# Patient Record
Sex: MALE | Race: BLACK OR AFRICAN AMERICAN | NOT HISPANIC OR LATINO | Employment: FULL TIME | URBAN - METROPOLITAN AREA
[De-identification: names, ages, dates, MRNs, and addresses within clinical notes are randomized per-mention and may not be internally consistent; named-entity substitution may affect disease eponyms.]

---

## 2021-09-14 ENCOUNTER — OFFICE VISIT (OUTPATIENT)
Dept: INTERNAL MEDICINE | Facility: CLINIC | Age: 51
End: 2021-09-14
Payer: COMMERCIAL

## 2021-09-14 VITALS
HEIGHT: 73 IN | BODY MASS INDEX: 20.16 KG/M2 | TEMPERATURE: 98 F | DIASTOLIC BLOOD PRESSURE: 82 MMHG | SYSTOLIC BLOOD PRESSURE: 136 MMHG | WEIGHT: 152.13 LBS

## 2021-09-14 DIAGNOSIS — C85.80 LARGE CELL LYMPHOMA: ICD-10-CM

## 2021-09-14 DIAGNOSIS — Z28.39 IMMUNIZATION DEFICIENCY: ICD-10-CM

## 2021-09-14 DIAGNOSIS — Z00.00 ANNUAL PHYSICAL EXAM: Primary | ICD-10-CM

## 2021-09-14 DIAGNOSIS — D17.79 LIPOMA OF OTHER SPECIFIED SITES: ICD-10-CM

## 2021-09-14 DIAGNOSIS — Z12.5 SCREENING FOR PROSTATE CANCER: ICD-10-CM

## 2021-09-14 DIAGNOSIS — Z12.11 COLON CANCER SCREENING: ICD-10-CM

## 2021-09-14 DIAGNOSIS — K42.9 UMBILICAL HERNIA WITHOUT OBSTRUCTION AND WITHOUT GANGRENE: ICD-10-CM

## 2021-09-14 PROBLEM — D17.9 LIPOMA: Status: ACTIVE | Noted: 2021-09-14

## 2021-09-14 PROCEDURE — 99999 PR PBB SHADOW E&M-EST. PATIENT-LVL III: CPT | Mod: PBBFAC,,, | Performed by: FAMILY MEDICINE

## 2021-09-14 PROCEDURE — 99999 PR PBB SHADOW E&M-EST. PATIENT-LVL III: ICD-10-PCS | Mod: PBBFAC,,, | Performed by: FAMILY MEDICINE

## 2021-09-14 PROCEDURE — 99204 OFFICE O/P NEW MOD 45 MIN: CPT | Mod: S$GLB,,, | Performed by: FAMILY MEDICINE

## 2021-09-14 PROCEDURE — 99204 PR OFFICE/OUTPT VISIT, NEW, LEVL IV, 45-59 MIN: ICD-10-PCS | Mod: S$GLB,,, | Performed by: FAMILY MEDICINE

## 2021-09-15 ENCOUNTER — TELEPHONE (OUTPATIENT)
Dept: INTERNAL MEDICINE | Facility: CLINIC | Age: 51
End: 2021-09-15

## 2021-12-20 PROBLEM — Z00.00 ANNUAL PHYSICAL EXAM: Status: RESOLVED | Noted: 2021-09-14 | Resolved: 2021-12-20

## 2023-02-13 ENCOUNTER — OFFICE VISIT (OUTPATIENT)
Dept: INTERNAL MEDICINE | Facility: CLINIC | Age: 53
End: 2023-02-13
Payer: COMMERCIAL

## 2023-02-13 ENCOUNTER — LAB VISIT (OUTPATIENT)
Dept: LAB | Facility: HOSPITAL | Age: 53
End: 2023-02-13
Attending: FAMILY MEDICINE
Payer: COMMERCIAL

## 2023-02-13 VITALS
BODY MASS INDEX: 20.28 KG/M2 | HEIGHT: 73 IN | RESPIRATION RATE: 18 BRPM | TEMPERATURE: 99 F | OXYGEN SATURATION: 98 % | SYSTOLIC BLOOD PRESSURE: 136 MMHG | WEIGHT: 153 LBS | DIASTOLIC BLOOD PRESSURE: 82 MMHG | HEART RATE: 93 BPM

## 2023-02-13 DIAGNOSIS — Z00.00 ANNUAL PHYSICAL EXAM: Primary | ICD-10-CM

## 2023-02-13 DIAGNOSIS — Z12.5 SCREENING FOR PROSTATE CANCER: ICD-10-CM

## 2023-02-13 DIAGNOSIS — Z12.11 COLON CANCER SCREENING: ICD-10-CM

## 2023-02-13 DIAGNOSIS — Z28.39 IMMUNIZATION DEFICIENCY: ICD-10-CM

## 2023-02-13 DIAGNOSIS — F31.9 BIPOLAR DISEASE, CHRONIC: ICD-10-CM

## 2023-02-13 DIAGNOSIS — D17.79 LIPOMA OF OTHER SPECIFIED SITES: ICD-10-CM

## 2023-02-13 DIAGNOSIS — Z30.09 VASECTOMY EVALUATION: ICD-10-CM

## 2023-02-13 LAB — COMPLEXED PSA SERPL-MCNC: 0.7 NG/ML (ref 0–4)

## 2023-02-13 PROCEDURE — 99999 PR PBB SHADOW E&M-EST. PATIENT-LVL IV: CPT | Mod: PBBFAC,,, | Performed by: FAMILY MEDICINE

## 2023-02-13 PROCEDURE — 99214 OFFICE O/P EST MOD 30 MIN: CPT | Mod: S$GLB,,, | Performed by: FAMILY MEDICINE

## 2023-02-13 PROCEDURE — 99214 PR OFFICE/OUTPT VISIT, EST, LEVL IV, 30-39 MIN: ICD-10-PCS | Mod: S$GLB,,, | Performed by: FAMILY MEDICINE

## 2023-02-13 PROCEDURE — 84153 ASSAY OF PSA TOTAL: CPT | Performed by: FAMILY MEDICINE

## 2023-02-13 PROCEDURE — 36415 COLL VENOUS BLD VENIPUNCTURE: CPT | Performed by: FAMILY MEDICINE

## 2023-02-13 PROCEDURE — 99999 PR PBB SHADOW E&M-EST. PATIENT-LVL IV: ICD-10-PCS | Mod: PBBFAC,,, | Performed by: FAMILY MEDICINE

## 2023-02-13 NOTE — PROGRESS NOTES
Subjective:       Patient ID: Darin Fulton is a 52 y.o. male.    Chief Complaint: Follow-up and Referral    Annual checkup.  He is interested in cancer screening.  Has a history of large-cell lymphoma no recurrence.  Currently smokes 1/2 pack cigarettes cigarettes a day.  He denies chest pain palpitations shortness of breath.  He denies nausea vomiting hematemesis melena.  Denies any constipation.  He denies any symptoms.  He is also interested urology referral for vasectomy.  Previous lab was reviewed.  He is interested in a PSA but not interested in other lab test at this time.  Discussed need for low-dose CT.  Wants to consider this and let me know when he is ready.    Follow-up  Pertinent negatives include no abdominal pain, chest pain, chills, congestion, coughing, fever, headaches, nausea, vomiting or weakness.   Review of Systems   Constitutional:  Negative for appetite change, chills, fever and unexpected weight change.   HENT:  Negative for congestion.    Respiratory:  Negative for cough, chest tightness, shortness of breath and wheezing.    Cardiovascular:  Negative for chest pain, palpitations and leg swelling.   Gastrointestinal:  Negative for abdominal distention, abdominal pain, blood in stool, constipation, diarrhea, nausea and vomiting.   Genitourinary:  Negative for difficulty urinating, dysuria, frequency, hematuria and urgency.   Neurological:  Negative for dizziness, weakness, light-headedness and headaches.        He has concern about possible bipolar disorder with episodes depression and hyperactivity   Hematological:  Negative for adenopathy. Does not bruise/bleed easily.     Objective:      Physical Exam  Constitutional:       General: He is not in acute distress.     Appearance: He is not ill-appearing or diaphoretic.   HENT:      Nose: Nose normal.   Eyes:      Conjunctiva/sclera: Conjunctivae normal.   Neck:      Comments: Normal thyroid 2+ carotids  Cardiovascular:      Rate and  Rhythm: Normal rate and regular rhythm.      Heart sounds: No murmur heard.    No gallop.   Pulmonary:      Effort: Pulmonary effort is normal. No respiratory distress.      Breath sounds: No wheezing, rhonchi or rales.   Abdominal:      General: There is no distension.      Palpations: Abdomen is soft. There is no mass.      Tenderness: There is no abdominal tenderness.   Lymphadenopathy:      Cervical: No cervical adenopathy.   Skin:     General: Skin is warm and dry.      Coloration: Skin is not pale.      Findings: No erythema.      Comments: Stable lipoma right hip   Neurological:      Mental Status: He is alert and oriented to person, place, and time.   Psychiatric:         Mood and Affect: Mood normal.         Behavior: Behavior normal.         Thought Content: Thought content normal.         Judgment: Judgment normal.       Lab Visit on 09/14/2021   Component Date Value Ref Range Status    WBC 09/14/2021 4.37  3.90 - 12.70 K/uL Final    RBC 09/14/2021 4.15 (L)  4.60 - 6.20 M/uL Final    Hemoglobin 09/14/2021 12.0 (L)  14.0 - 18.0 g/dL Final    Hematocrit 09/14/2021 38.2 (L)  40.0 - 54.0 % Final    MCV 09/14/2021 92  82 - 98 fL Final    MCH 09/14/2021 28.9  27.0 - 31.0 pg Final    MCHC 09/14/2021 31.4 (L)  32.0 - 36.0 g/dL Final    RDW 09/14/2021 18.0 (H)  11.5 - 14.5 % Final    Platelets 09/14/2021 255  150 - 450 K/uL Final    MPV 09/14/2021 12.2  9.2 - 12.9 fL Final    Immature Granulocytes 09/14/2021 0.2  0.0 - 0.5 % Final    Gran # (ANC) 09/14/2021 2.2  1.8 - 7.7 K/uL Final    Immature Grans (Abs) 09/14/2021 0.01  0.00 - 0.04 K/uL Final    Lymph # 09/14/2021 1.5  1.0 - 4.8 K/uL Final    Mono # 09/14/2021 0.6  0.3 - 1.0 K/uL Final    Eos # 09/14/2021 0.1  0.0 - 0.5 K/uL Final    Baso # 09/14/2021 0.05  0.00 - 0.20 K/uL Final    nRBC 09/14/2021 0  0 /100 WBC Final    Gran % 09/14/2021 51.0  38.0 - 73.0 % Final    Lymph % 09/14/2021 33.6  18.0 - 48.0 % Final    Mono % 09/14/2021 13.0  4.0 - 15.0 % Final     Eosinophil % 09/14/2021 1.1  0.0 - 8.0 % Final    Basophil % 09/14/2021 1.1  0.0 - 1.9 % Final    Differential Method 09/14/2021 Automated   Final    Sodium 09/14/2021 140  136 - 145 mmol/L Final    Potassium 09/14/2021 4.7  3.5 - 5.1 mmol/L Final    Chloride 09/14/2021 104  95 - 110 mmol/L Final    CO2 09/14/2021 23  23 - 29 mmol/L Final    Glucose 09/14/2021 77  70 - 110 mg/dL Final    BUN 09/14/2021 9  6 - 20 mg/dL Final    Creatinine 09/14/2021 0.8  0.5 - 1.4 mg/dL Final    Calcium 09/14/2021 10.1  8.7 - 10.5 mg/dL Final    Total Protein 09/14/2021 6.8  6.0 - 8.4 g/dL Final    Albumin 09/14/2021 3.9  3.5 - 5.2 g/dL Final    Total Bilirubin 09/14/2021 0.5  0.1 - 1.0 mg/dL Final    Alkaline Phosphatase 09/14/2021 94  55 - 135 U/L Final    AST 09/14/2021 22  10 - 40 U/L Final    ALT 09/14/2021 18  10 - 44 U/L Final    Anion Gap 09/14/2021 13  8 - 16 mmol/L Final    eGFR if African American 09/14/2021 >60.0  >60 mL/min/1.73 m^2 Final    eGFR if non African American 09/14/2021 >60.0  >60 mL/min/1.73 m^2 Final    Cholesterol 09/14/2021 155  120 - 199 mg/dL Final    Triglycerides 09/14/2021 49  30 - 150 mg/dL Final    HDL 09/14/2021 71  40 - 75 mg/dL Final    LDL Cholesterol 09/14/2021 74.2  63.0 - 159.0 mg/dL Final    HDL/Cholesterol Ratio 09/14/2021 45.8  20.0 - 50.0 % Final    Total Cholesterol/HDL Ratio 09/14/2021 2.2  2.0 - 5.0 Final    Non-HDL Cholesterol 09/14/2021 84  mg/dL Final    TSH 09/14/2021 0.594  0.400 - 4.000 uIU/mL Final    PSA, Screen 09/14/2021 1.1  0.00 - 4.00 ng/mL Final    Hepatitis C Ab 09/14/2021 Negative  Negative Final    HIV 1/2 Ag/Ab 09/14/2021 Negative  Negative Final     Assessment:       1. Annual physical exam    2. Colon cancer screening    3. Bipolar disease, chronic    4. Vasectomy evaluation    5. Screening for prostate cancer    6. Lipoma of other specified sites    7. Immunization deficiency          Plan:   Colonoscopy ordered urology referral for vasectomy evaluation psychology  referral for bipolar disorder evaluation PSA was ordered he declined immunizations he wants to defer CT low-dose lung.  Discuss cigarette cessation

## 2023-02-14 ENCOUNTER — PATIENT MESSAGE (OUTPATIENT)
Dept: PSYCHIATRY | Facility: CLINIC | Age: 53
End: 2023-02-14
Payer: COMMERCIAL

## 2023-02-16 ENCOUNTER — TELEPHONE (OUTPATIENT)
Dept: UROLOGY | Facility: CLINIC | Age: 53
End: 2023-02-16
Payer: COMMERCIAL

## 2023-02-20 ENCOUNTER — HOSPITAL ENCOUNTER (OUTPATIENT)
Dept: PREADMISSION TESTING | Facility: HOSPITAL | Age: 53
Discharge: HOME | End: 2023-02-20
Attending: INTERNAL MEDICINE
Payer: COMMERCIAL

## 2023-02-20 DIAGNOSIS — Z12.11 COLON CANCER SCREENING: Primary | ICD-10-CM

## 2023-02-20 RX ORDER — POLYETHYLENE GLYCOL 3350, SODIUM SULFATE ANHYDROUS, SODIUM BICARBONATE, SODIUM CHLORIDE, POTASSIUM CHLORIDE 236; 22.74; 6.74; 5.86; 2.97 G/4L; G/4L; G/4L; G/4L; G/4L
4 POWDER, FOR SOLUTION ORAL ONCE
Qty: 4000 ML | Refills: 0 | Status: SHIPPED | OUTPATIENT
Start: 2023-02-20 | End: 2023-02-20

## 2023-03-08 DIAGNOSIS — Z12.11 COLON CANCER SCREENING: Primary | ICD-10-CM

## 2023-03-27 ENCOUNTER — HOSPITAL ENCOUNTER (OUTPATIENT)
Dept: PREADMISSION TESTING | Facility: HOSPITAL | Age: 53
Discharge: HOME | End: 2023-03-27
Attending: COLON & RECTAL SURGERY
Payer: COMMERCIAL

## 2023-03-27 DIAGNOSIS — Z12.11 COLON CANCER SCREENING: Primary | ICD-10-CM

## 2023-03-27 RX ORDER — POLYETHYLENE GLYCOL 3350, SODIUM SULFATE ANHYDROUS, SODIUM BICARBONATE, SODIUM CHLORIDE, POTASSIUM CHLORIDE 236; 22.74; 6.74; 5.86; 2.97 G/4L; G/4L; G/4L; G/4L; G/4L
4 POWDER, FOR SOLUTION ORAL ONCE
Qty: 4000 ML | Refills: 0 | Status: SHIPPED | OUTPATIENT
Start: 2023-03-27 | End: 2023-03-27

## 2023-05-15 PROBLEM — Z00.00 ANNUAL PHYSICAL EXAM: Status: RESOLVED | Noted: 2021-09-14 | Resolved: 2023-05-15

## 2023-06-15 ENCOUNTER — TELEPHONE (OUTPATIENT)
Dept: INTERNAL MEDICINE | Facility: CLINIC | Age: 53
End: 2023-06-15
Payer: COMMERCIAL

## 2023-06-15 ENCOUNTER — PATIENT MESSAGE (OUTPATIENT)
Dept: INTERNAL MEDICINE | Facility: CLINIC | Age: 53
End: 2023-06-15
Payer: COMMERCIAL

## 2023-06-15 NOTE — TELEPHONE ENCOUNTER
Returned call to patient regarding appointment request. Spoke with significant other who stated patient was not home. Left message for patient to return call to schedule an appointment.

## 2023-06-19 ENCOUNTER — OFFICE VISIT (OUTPATIENT)
Dept: INTERNAL MEDICINE | Facility: CLINIC | Age: 53
End: 2023-06-19
Payer: COMMERCIAL

## 2023-06-19 VITALS
WEIGHT: 150.81 LBS | SYSTOLIC BLOOD PRESSURE: 140 MMHG | RESPIRATION RATE: 18 BRPM | HEART RATE: 79 BPM | DIASTOLIC BLOOD PRESSURE: 88 MMHG | BODY MASS INDEX: 19.99 KG/M2 | HEIGHT: 73 IN | OXYGEN SATURATION: 97 %

## 2023-06-19 DIAGNOSIS — Z72.0 SMOKING TRYING TO QUIT: Primary | ICD-10-CM

## 2023-06-19 DIAGNOSIS — F17.200 NEEDS SMOKING CESSATION EDUCATION: ICD-10-CM

## 2023-06-19 DIAGNOSIS — F48.9 MENTAL HEALTH PROBLEM: ICD-10-CM

## 2023-06-19 PROCEDURE — 99999 PR PBB SHADOW E&M-EST. PATIENT-LVL IV: ICD-10-PCS | Mod: PBBFAC,,, | Performed by: NURSE PRACTITIONER

## 2023-06-19 PROCEDURE — 99214 PR OFFICE/OUTPT VISIT, EST, LEVL IV, 30-39 MIN: ICD-10-PCS | Mod: S$GLB,,, | Performed by: NURSE PRACTITIONER

## 2023-06-19 PROCEDURE — 99214 OFFICE O/P EST MOD 30 MIN: CPT | Mod: S$GLB,,, | Performed by: NURSE PRACTITIONER

## 2023-06-19 PROCEDURE — 99999 PR PBB SHADOW E&M-EST. PATIENT-LVL IV: CPT | Mod: PBBFAC,,, | Performed by: NURSE PRACTITIONER

## 2023-06-19 RX ORDER — NICOTINE 7MG/24HR
1 PATCH, TRANSDERMAL 24 HOURS TRANSDERMAL DAILY
Qty: 14 PATCH | Refills: 0 | Status: SHIPPED | OUTPATIENT
Start: 2023-06-19

## 2023-06-19 RX ORDER — BUPROPION HYDROCHLORIDE 150 MG/1
150 TABLET ORAL DAILY
Qty: 30 TABLET | Refills: 11 | Status: SHIPPED | OUTPATIENT
Start: 2023-06-19 | End: 2023-09-15 | Stop reason: SDUPTHER

## 2023-06-19 NOTE — PROGRESS NOTES
Darin Fulton  06/19/2023  [id number]    Ruslan Flower MD  Patient Care Team:  Ruslan Flower MD as PCP - General (Family Medicine)          Visit Type:an urgent visit for a new problem    Chief Complaint:  Chief Complaint   Patient presents with    Anxiety    Depression       History of Present Illness:  53-year-old male presents today requesting a referral to Psychiatry.  Patient feels that he might be suffering from bipolar disorder and needs evaluation and treatment.  Patient also is requesting smoking cessation.      History:  Past Medical History:   Diagnosis Date    Hodgkin's disease     Ulcer      Past Surgical History:   Procedure Laterality Date    HERNIA REPAIR      MANDIBLE FRACTURE SURGERY      stomach surgery because of ulcer      WRIST SURGERY       Family History   Problem Relation Age of Onset    No Known Problems Mother     Hypertension Father     No Known Problems Daughter     No Known Problems Son      Social History     Socioeconomic History    Marital status: Single   Tobacco Use    Smoking status: Every Day     Packs/day: 0.50     Types: Cigarettes    Smokeless tobacco: Never   Substance and Sexual Activity    Alcohol use: Yes     Comment: socially    Drug use: Yes     Types: Marijuana     Patient Active Problem List   Diagnosis    Large cell lymphoma    Umbilical hernia    Lipoma    Immunization deficiency    Bipolar disease, chronic     Review of patient's allergies indicates:  No Known Allergies    The following were reviewed at this visit: active problem list, medication list, allergies, family history, social history, and health maintenance.    Medications:  No current outpatient medications on file prior to visit.     No current facility-administered medications on file prior to visit.       Medications have been reviewed and reconciled with patient at this visit.  Barriers to medications reviewed with patient.    Adverse reactions to current medications reviewed with  patient..    Over the counter medications reviewed and reconciled with patient.    Exam:  Wt Readings from Last 3 Encounters:   06/19/23 68.4 kg (150 lb 12.7 oz)   02/13/23 69.4 kg (153 lb)   09/14/21 69 kg (152 lb 1.9 oz)     Temp Readings from Last 3 Encounters:   02/13/23 98.5 °F (36.9 °C) (Tympanic)   09/14/21 98.3 °F (36.8 °C) (Tympanic)   09/27/14 98.2 °F (36.8 °C) (Oral)     BP Readings from Last 3 Encounters:   06/19/23 (!) 140/88   02/13/23 136/82   09/14/21 136/82     Pulse Readings from Last 3 Encounters:   06/19/23 79   02/13/23 93   09/27/14 68     Body mass index is 19.89 kg/m².      Review of Systems   Constitutional:  Negative for fever.   HENT:  Negative for hearing loss.    Eyes:  Negative for discharge.   Respiratory:  Negative for cough, shortness of breath and wheezing.    Cardiovascular:  Negative for chest pain and palpitations.   Gastrointestinal:  Negative for blood in stool, constipation, diarrhea, nausea and vomiting.   Genitourinary:  Negative for hematuria and urgency.   Musculoskeletal:  Negative for neck pain.   Neurological:  Negative for speech change, weakness and headaches.   Endo/Heme/Allergies:  Negative for polydipsia.   All other systems reviewed and are negative.  Physical Exam  Vitals and nursing note reviewed.   Constitutional:       Appearance: Normal appearance. He is normal weight.   HENT:      Head: Normocephalic and atraumatic.      Right Ear: Tympanic membrane, ear canal and external ear normal.      Left Ear: Tympanic membrane, ear canal and external ear normal.      Nose: Nose normal.      Mouth/Throat:      Mouth: Mucous membranes are moist.      Pharynx: Oropharynx is clear.   Eyes:      Extraocular Movements: Extraocular movements intact.      Conjunctiva/sclera: Conjunctivae normal.      Pupils: Pupils are equal, round, and reactive to light.   Cardiovascular:      Rate and Rhythm: Normal rate and regular rhythm.      Pulses: Normal pulses.      Heart sounds:  Normal heart sounds.   Pulmonary:      Effort: Pulmonary effort is normal.      Breath sounds: Normal breath sounds.   Abdominal:      General: Bowel sounds are normal.      Palpations: Abdomen is soft.   Musculoskeletal:         General: Normal range of motion.      Cervical back: Normal range of motion and neck supple.   Skin:     General: Skin is warm and dry.      Capillary Refill: Capillary refill takes less than 2 seconds.   Neurological:      General: No focal deficit present.      Mental Status: He is alert and oriented to person, place, and time.   Psychiatric:         Mood and Affect: Mood normal.         Behavior: Behavior normal.         Thought Content: Thought content normal.         Judgment: Judgment normal.       Laboratory Reviewed ({Yes)  Lab Results   Component Value Date    WBC 4.37 09/14/2021    HGB 12.0 (L) 09/14/2021    HCT 38.2 (L) 09/14/2021     09/14/2021    CHOL 155 09/14/2021    TRIG 49 09/14/2021    HDL 71 09/14/2021    ALT 18 09/14/2021    AST 22 09/14/2021     09/14/2021    K 4.7 09/14/2021     09/14/2021    CREATININE 0.8 09/14/2021    BUN 9 09/14/2021    CO2 23 09/14/2021    TSH 0.594 09/14/2021    PSA 0.70 02/13/2023       Darin was seen today for anxiety and depression.    Diagnoses and all orders for this visit:    Smoking trying to quit  -     buPROPion (WELLBUTRIN XL) 150 MG TB24 tablet; Take 1 tablet (150 mg total) by mouth once daily.  -     nicotine (NICODERM CQ) 7 mg/24 hr; Place 1 patch onto the skin once daily.    Mental health problem  -     Ambulatory referral/consult to Psychiatry; Future  -     Ambulatory referral/consult to Psychiatry; Future        - F/u with psych  -F/U smoking cessation program        Care Plan/Goals: Reviewed    Goals    None     Darin was seen today for anxiety and depression.    Diagnoses and all orders for this visit:    Smoking trying to quit  -     buPROPion (WELLBUTRIN XL) 150 MG TB24 tablet; Take 1 tablet (150 mg  total) by mouth once daily.  -     nicotine (NICODERM CQ) 7 mg/24 hr; Place 1 patch onto the skin once daily.    Mental health problem  -     Ambulatory referral/consult to Psychiatry; Future  -     Ambulatory referral/consult to Psychiatry; Future         Follow up: Follow up for with psych for evaluation and treatment .    After visit summary was printed and given to patient upon discharge today.  Patient goals and care plan are included in After Visit Summary.

## 2023-09-15 DIAGNOSIS — Z72.0 SMOKING TRYING TO QUIT: ICD-10-CM

## 2023-09-15 RX ORDER — BUPROPION HYDROCHLORIDE 150 MG/1
150 TABLET ORAL DAILY
Qty: 30 TABLET | Refills: 11 | Status: SHIPPED | OUTPATIENT
Start: 2023-09-15 | End: 2024-09-14

## 2024-02-14 ENCOUNTER — LAB VISIT (OUTPATIENT)
Dept: LAB | Facility: HOSPITAL | Age: 54
End: 2024-02-14
Payer: COMMERCIAL

## 2024-02-14 ENCOUNTER — OFFICE VISIT (OUTPATIENT)
Dept: INTERNAL MEDICINE | Facility: CLINIC | Age: 54
End: 2024-02-14
Payer: COMMERCIAL

## 2024-02-14 VITALS
SYSTOLIC BLOOD PRESSURE: 130 MMHG | OXYGEN SATURATION: 97 % | BODY MASS INDEX: 20.74 KG/M2 | TEMPERATURE: 98 F | RESPIRATION RATE: 18 BRPM | DIASTOLIC BLOOD PRESSURE: 74 MMHG | WEIGHT: 156.5 LBS | HEIGHT: 73 IN | HEART RATE: 96 BPM

## 2024-02-14 DIAGNOSIS — D17.1 LIPOMA OF TORSO: ICD-10-CM

## 2024-02-14 DIAGNOSIS — Z00.00 ANNUAL PHYSICAL EXAM: Primary | ICD-10-CM

## 2024-02-14 DIAGNOSIS — Z12.11 COLON CANCER SCREENING: ICD-10-CM

## 2024-02-14 DIAGNOSIS — Z30.09 VASECTOMY EVALUATION: ICD-10-CM

## 2024-02-14 DIAGNOSIS — Z12.5 SCREENING FOR PROSTATE CANCER: ICD-10-CM

## 2024-02-14 DIAGNOSIS — Z00.00 ANNUAL PHYSICAL EXAM: ICD-10-CM

## 2024-02-14 LAB
BILIRUB UR QL STRIP: NEGATIVE
CLARITY UR: CLEAR
COLOR UR: YELLOW
GLUCOSE UR QL STRIP: NEGATIVE
HGB UR QL STRIP: NEGATIVE
KETONES UR QL STRIP: NEGATIVE
LEUKOCYTE ESTERASE UR QL STRIP: NEGATIVE
NITRITE UR QL STRIP: NEGATIVE
PH UR STRIP: 6 [PH] (ref 5–8)
PROT UR QL STRIP: NEGATIVE
SP GR UR STRIP: 1.02 (ref 1–1.03)
URN SPEC COLLECT METH UR: NORMAL
UROBILINOGEN UR STRIP-ACNC: NEGATIVE EU/DL

## 2024-02-14 PROCEDURE — 81003 URINALYSIS AUTO W/O SCOPE: CPT

## 2024-02-14 PROCEDURE — 99386 PREV VISIT NEW AGE 40-64: CPT | Mod: S$GLB,,,

## 2024-02-14 PROCEDURE — 99999 PR PBB SHADOW E&M-EST. PATIENT-LVL V: CPT | Mod: PBBFAC,,,

## 2024-02-14 NOTE — PROGRESS NOTES
Darin Fulton  02/14/2024  [id number]    Ruslan Flower MD  Patient Care Team:  Ruslan Flower MD as PCP - General (Family Medicine)          Visit Type:a scheduled routine follow-up visit    Chief Complaint:  Chief Complaint   Patient presents with    Back Pain    Urinary Frequency    Sterilization    Mass       History of Present Illness:    Presents today for an annual exam     Interested in getting a vasectomy     Has lipoma on right hip  Has been there for years  Would like it examined     History:  Past Medical History:   Diagnosis Date    Hodgkin's disease     Ulcer      Past Surgical History:   Procedure Laterality Date    HERNIA REPAIR      MANDIBLE FRACTURE SURGERY      stomach surgery because of ulcer      WRIST SURGERY       Family History   Problem Relation Age of Onset    No Known Problems Mother     Hypertension Father     No Known Problems Daughter     No Known Problems Son      Social History     Socioeconomic History    Marital status: Single   Tobacco Use    Smoking status: Every Day     Current packs/day: 0.50     Types: Cigarettes    Smokeless tobacco: Never   Substance and Sexual Activity    Alcohol use: Yes     Comment: socially    Drug use: Yes     Types: Marijuana     Social Determinants of Health     Financial Resource Strain: Low Risk  (2/7/2024)    Overall Financial Resource Strain (CARDIA)     Difficulty of Paying Living Expenses: Not very hard   Food Insecurity: Food Insecurity Present (2/7/2024)    Hunger Vital Sign     Worried About Running Out of Food in the Last Year: Sometimes true     Ran Out of Food in the Last Year: Never true   Transportation Needs: No Transportation Needs (2/7/2024)    PRAPARE - Transportation     Lack of Transportation (Medical): No     Lack of Transportation (Non-Medical): No   Physical Activity: Sufficiently Active (2/7/2024)    Exercise Vital Sign     Days of Exercise per Week: 5 days     Minutes of Exercise per Session: 30 min   Stress: Stress  Concern Present (2/7/2024)    [employer] - Occupational Stress Questionnaire     Feeling of Stress : Rather much   Social Connections: Unknown (2/7/2024)    Social Connection and Isolation Panel [NHANES]     Frequency of Communication with Friends and Family: Once a week     Frequency of Social Gatherings with Friends and Family: Once a week     Active Member of Clubs or Organizations: No     Attends Club or Organization Meetings: Never     Marital Status:    Housing Stability: High Risk (2/7/2024)    Housing Stability Vital Sign     Unable to Pay for Housing in the Last Year: Yes     Number of Places Lived in the Last Year: 1     Unstable Housing in the Last Year: No     Patient Active Problem List   Diagnosis    Large cell lymphoma    Umbilical hernia    Lipoma    Immunization deficiency    Bipolar disease, chronic     Review of patient's allergies indicates:  No Known Allergies    The following were reviewed at this visit: active problem list, medication list, allergies, family history, social history, and health maintenance.    Medications:  Current Outpatient Medications on File Prior to Visit   Medication Sig Dispense Refill    buPROPion (WELLBUTRIN XL) 150 MG TB24 tablet Take 1 tablet (150 mg total) by mouth once daily. 30 tablet 11    nicotine (NICODERM CQ) 7 mg/24 hr Place 1 patch onto the skin once daily. (Patient not taking: Reported on 2/14/2024) 14 patch 0     No current facility-administered medications on file prior to visit.       Medications have been reviewed and reconciled with patient at this visit.  Barriers to medications reviewed with patient.    Adverse reactions to current medications reviewed with patient..    Over the counter medications reviewed and reconciled with patient.    Exam:  Wt Readings from Last 3 Encounters:   02/14/24 71 kg (156 lb 8.4 oz)   06/19/23 68.4 kg (150 lb 12.7 oz)   02/13/23 69.4 kg (153 lb)     Temp Readings from Last 3  Encounters:   02/14/24 97.8 °F (36.6 °C) (Tympanic)   02/13/23 98.5 °F (36.9 °C) (Tympanic)   09/14/21 98.3 °F (36.8 °C) (Tympanic)     BP Readings from Last 3 Encounters:   02/14/24 130/74   06/19/23 (!) 140/88   02/13/23 136/82     Pulse Readings from Last 3 Encounters:   02/14/24 96   06/19/23 79   02/13/23 93     Body mass index is 20.65 kg/m².      Review of Systems   HENT:  Negative for hearing loss.    Eyes:  Negative for discharge.   Respiratory:  Negative for wheezing.    Cardiovascular:  Negative for chest pain and palpitations.   Gastrointestinal:  Negative for blood in stool, constipation, diarrhea and vomiting.   Genitourinary:  Negative for hematuria and urgency.   Musculoskeletal:  Negative for neck pain.   Neurological:  Negative for weakness.   Endo/Heme/Allergies:  Negative for polydipsia.     Answers submitted by the patient for this visit:  Review of Systems Questionnaire (Submitted on 2/7/2024)  activity change: No  unexpected weight change: No  rhinorrhea: Yes  trouble swallowing: No  visual disturbance: Yes  chest tightness: No  polyuria: No  difficulty urinating: No  joint swelling: No  arthralgias: No  confusion: Yes  dysphoric mood: Yes      Physical Exam  Vitals and nursing note reviewed.   Constitutional:       General: He is not in acute distress.     Appearance: He is well-developed. He is not diaphoretic.   HENT:      Head: Normocephalic and atraumatic.      Right Ear: External ear normal.      Left Ear: External ear normal.   Cardiovascular:      Rate and Rhythm: Normal rate and regular rhythm.      Heart sounds: Normal heart sounds. No murmur heard.  Pulmonary:      Effort: Pulmonary effort is normal. No respiratory distress.      Breath sounds: Normal breath sounds. No wheezing.   Abdominal:      General: Bowel sounds are normal.   Skin:            Comments: Lipoma right hip    Neurological:      Mental Status: He is alert and oriented to person, place, and time.   Psychiatric:          Behavior: Behavior normal.         Thought Content: Thought content normal.         Judgment: Judgment normal.         Laboratory Reviewed ({Yes)  Lab Results   Component Value Date    WBC 4.37 09/14/2021    HGB 12.0 (L) 09/14/2021    HCT 38.2 (L) 09/14/2021     09/14/2021    CHOL 155 09/14/2021    TRIG 49 09/14/2021    HDL 71 09/14/2021    ALT 18 09/14/2021    AST 22 09/14/2021     09/14/2021    K 4.7 09/14/2021     09/14/2021    CREATININE 0.8 09/14/2021    BUN 9 09/14/2021    CO2 23 09/14/2021    TSH 0.594 09/14/2021    PSA 0.70 02/13/2023       Darin was seen today for back pain, urinary frequency, sterilization and mass.    Diagnoses and all orders for this visit:    Annual physical exam  -     CBC Auto Differential; Future  -     TSH; Future  -     Lipid Panel; Future  -     Comprehensive Metabolic Panel; Future  -     HEMOGLOBIN A1C; Future  -     Urinalysis, Reflex to Urine Culture; Future    Vasectomy evaluation  -     Ambulatory referral/consult to Urology; Future    Colon cancer screening  -     Ambulatory referral/consult to Endo Procedure ; Future    Lipoma of torso  Would like to cont to monitor for now  May be interested in getting it removed later     Screening for prostate cancer  -     PSA, SCREENING; Future      Labs today   Refer to urology     Will schedule a follow up appointment with Dr. Flower in one year     Care Plan/Goals: Reviewed    Goals    None         Follow up: No follow-ups on file.    After visit summary was printed and given to patient upon discharge today.  Patient goals and care plan are included in After Visit Summary.

## 2024-02-19 ENCOUNTER — HOSPITAL ENCOUNTER (OUTPATIENT)
Dept: PREADMISSION TESTING | Facility: HOSPITAL | Age: 54
Discharge: HOME | End: 2024-02-19
Attending: INTERNAL MEDICINE
Payer: COMMERCIAL

## 2024-02-19 DIAGNOSIS — Z12.11 COLON CANCER SCREENING: Primary | ICD-10-CM

## 2024-02-20 ENCOUNTER — OFFICE VISIT (OUTPATIENT)
Dept: UROLOGY | Facility: CLINIC | Age: 54
End: 2024-02-20
Payer: COMMERCIAL

## 2024-02-20 VITALS
RESPIRATION RATE: 14 BRPM | HEIGHT: 73 IN | BODY MASS INDEX: 20.98 KG/M2 | SYSTOLIC BLOOD PRESSURE: 165 MMHG | HEART RATE: 82 BPM | DIASTOLIC BLOOD PRESSURE: 94 MMHG | WEIGHT: 158.31 LBS

## 2024-02-20 DIAGNOSIS — Z30.2 STERILIZATION: ICD-10-CM

## 2024-02-20 PROCEDURE — 99203 OFFICE O/P NEW LOW 30 MIN: CPT | Mod: S$GLB,,, | Performed by: UROLOGY

## 2024-02-20 PROCEDURE — 99999 PR PBB SHADOW E&M-EST. PATIENT-LVL III: CPT | Mod: PBBFAC,,, | Performed by: UROLOGY

## 2024-02-20 NOTE — PROGRESS NOTES
HPI:    Mr. Fulton is a 53 y.o. male who presents for evaluation re: vasectomy. He has 5 children, youngest 1-month-old and he is certain that he desires no more. He is aware of other forms of contraception. He is aware that vasectomy is to be considered permanent/irreversible.    PATIENT HISTORY:    Past Medical History:   Diagnosis Date    Hodgkin's disease     Ulcer        Past Surgical History:   Procedure Laterality Date    HERNIA REPAIR      MANDIBLE FRACTURE SURGERY      stomach surgery because of ulcer      WRIST SURGERY         Family History   Problem Relation Age of Onset    No Known Problems Mother     Hypertension Father     No Known Problems Daughter     No Known Problems Son        Social History     Socioeconomic History    Marital status: Single   Tobacco Use    Smoking status: Every Day     Current packs/day: 0.50     Types: Cigarettes    Smokeless tobacco: Never   Substance and Sexual Activity    Alcohol use: Yes     Comment: socially    Drug use: Yes     Types: Marijuana     Social Determinants of Health     Financial Resource Strain: Low Risk  (2/7/2024)    Overall Financial Resource Strain (CARDIA)     Difficulty of Paying Living Expenses: Not very hard   Food Insecurity: Food Insecurity Present (2/7/2024)    Hunger Vital Sign     Worried About Running Out of Food in the Last Year: Sometimes true     Ran Out of Food in the Last Year: Never true   Transportation Needs: No Transportation Needs (2/7/2024)    PRAPARE - Transportation     Lack of Transportation (Medical): No     Lack of Transportation (Non-Medical): No   Physical Activity: Sufficiently Active (2/7/2024)    Exercise Vital Sign     Days of Exercise per Week: 5 days     Minutes of Exercise per Session: 30 min   Stress: Stress Concern Present (2/7/2024)    [employer] - Occupational Stress Questionnaire     Feeling of Stress : Rather much   Social Connections: Unknown (2/7/2024)    Social Connection and  Isolation Panel [NHANES]     Frequency of Communication with Friends and Family: Once a week     Frequency of Social Gatherings with Friends and Family: Once a week     Active Member of Clubs or Organizations: No     Attends Club or Organization Meetings: Never     Marital Status:    Housing Stability: High Risk (2/7/2024)    Housing Stability Vital Sign     Unable to Pay for Housing in the Last Year: Yes     Number of Places Lived in the Last Year: 1     Unstable Housing in the Last Year: No         Current Outpatient Medications:     buPROPion (WELLBUTRIN XL) 150 MG TB24 tablet, Take 1 tablet (150 mg total) by mouth once daily., Disp: 30 tablet, Rfl: 11    nicotine (NICODERM CQ) 7 mg/24 hr, Place 1 patch onto the skin once daily., Disp: 14 patch, Rfl: 0     Allergies:  Patient has no known allergies.    PHYSICAL EXAM:    General appearance: Well-developed, well-nourished male in no apparent distress.    Genitourinary: Bilateral testicles palpaby normal. Epididymis, vas deferens, and cord structures are normal bilaterally.    IMPRESSION / PLAN:    Darin Fulton is a 53 y.o. male who presents for evaluation re: vasectomy.    I discussed with the patient risks and benefits, as well as alternatives. We discussed possible complications at length, including fever, infection, bleeding (possibly requiring re-operation), testicular injury or atrophy with loss of function, chronic pain, persistent or recurrent presence of sperm in the ejaculate, or vasal recanalization.    We discussed that he should stop aspirin, ibuprofen, and similar products at least one week prior to the procedure. Acetaminophen is okay to use for headaches, pain, etc.    He should bring an athletic supporter and loose-fitting sweat pants on the day of the procedure.    We discussed that he must continue to use contraception until two consecutive semen analyses (checked at approximately 8 and 10 weeks post-vasectomy) reveal  azoospermia.    He will schedule an elective vasectomy.      He was decided he does not want to take Valium prior to the procedure.

## 2024-02-27 ENCOUNTER — PROCEDURE VISIT (OUTPATIENT)
Dept: UROLOGY | Facility: CLINIC | Age: 54
End: 2024-02-27
Payer: COMMERCIAL

## 2024-02-27 VITALS
RESPIRATION RATE: 18 BRPM | SYSTOLIC BLOOD PRESSURE: 154 MMHG | BODY MASS INDEX: 21.77 KG/M2 | DIASTOLIC BLOOD PRESSURE: 95 MMHG | HEART RATE: 78 BPM | WEIGHT: 165 LBS

## 2024-02-27 DIAGNOSIS — Z30.2 STERILIZATION: Primary | ICD-10-CM

## 2024-02-27 PROCEDURE — 88302 TISSUE EXAM BY PATHOLOGIST: CPT | Mod: 59 | Performed by: PATHOLOGY

## 2024-02-27 PROCEDURE — 55250 REMOVAL OF SPERM DUCT(S): CPT | Mod: S$GLB,,, | Performed by: UROLOGY

## 2024-02-27 PROCEDURE — 88302 TISSUE EXAM BY PATHOLOGIST: CPT | Mod: 26,,, | Performed by: PATHOLOGY

## 2024-02-27 NOTE — PROCEDURES
Vasectomy    Date/Time: 2/27/2024 4:00 PM    Performed by: Lalo Steel MD  Authorized by: Lalo Steel MD    Anesthesia:  10 cc 2% Lidocaine  Incisions:  1  Vas:  Fulgurated and Clipped     Vasectomy Procedure Note  PreopDx. Sterilization  Post op Dx. Same  Procedure: bilateral vasectomy  EBL <5 cc  Complications : none  Specimen: 1) right vas deferens 2) left vas deferens  Anesthesia: local lidocaine 1% 10 cc  Procedure in detail:   A time-out was performed.  After being prepped and draped sterilely, patient's left vas deferens was palpated and then grasped and brought up to the skin.  The skin and subcutaneous tissues along with the vas deferens were infiltrated with 1% lidocaine.  This point a 15 blade was used to make a subcentimeter incision in the scrotal median raphe.  The vas ring was used to grasp the vas deferens.  The vas dissected was used to carefully dissect layers away from the vas deferens until it was isolated.  A 2 cm portion was isolated.  Proximal and distal titanium clips were placed.  The intervening segment was excised and sent for permanent pathology.  The ends were then cauterized with the electrocautery.  Inspection for meticulous hemostasis was made.  Minimal cautery was used for hemostasis.  I then moved to the opposite side of the table and performed a similar procedure with the right vas deferens.  Once this was completed the specimen was also obtained and sent for pathology.  The midline incision was closed with a 4-0 Chromic suture.  Bacitracin ointment and 4x4s were applied.    The patient was once again counseled on postoperative instructions and reminded to bring into postoperative semen specimens to ensure azoospermia prior to resuming sexual activity without contraception.    He told me took a BC powder a day or 2 ago.  He did have some oozing during the procedure.  I have asked him to take it easy for a couple of days.

## 2024-03-04 LAB
FINAL PATHOLOGIC DIAGNOSIS: NORMAL
GROSS: NORMAL
Lab: NORMAL

## 2024-05-03 DIAGNOSIS — Z72.0 SMOKING TRYING TO QUIT: ICD-10-CM

## 2024-05-03 RX ORDER — BUPROPION HYDROCHLORIDE 150 MG/1
150 TABLET ORAL DAILY
Qty: 30 TABLET | Refills: 11 | Status: SHIPPED | OUTPATIENT
Start: 2024-05-03 | End: 2024-06-04 | Stop reason: DRUGHIGH

## 2024-05-03 RX ORDER — NICOTINE 7MG/24HR
1 PATCH, TRANSDERMAL 24 HOURS TRANSDERMAL DAILY
Qty: 14 PATCH | Refills: 0 | Status: SHIPPED | OUTPATIENT
Start: 2024-05-03 | End: 2024-06-04 | Stop reason: SDUPTHER

## 2024-05-14 ENCOUNTER — TELEPHONE (OUTPATIENT)
Dept: PSYCHIATRY | Facility: CLINIC | Age: 54
End: 2024-05-14
Payer: COMMERCIAL

## 2024-05-14 NOTE — TELEPHONE ENCOUNTER
----- Message from Blanquita Montoya MA sent at 5/14/2024 12:11 PM CDT -----  Regarding: Testing Referral  Contact: Lucinda/ wife  Good afternoon,  This patient has a referral in the chart from 6/2023 to be scheduled for testing. Patient hasn't spoken with anyone and would like to be contacted for scheduling. It is ok to call wife's number listed.  ----- Message -----  From: Agnes Fletcher  Sent: 5/14/2024  11:16 AM CDT  To: [facility] Psych Clinical Staff    Patients wife calling to receive a call back at [phone]. Reports pt has a referral for psych and would like to proceed with scheduling.

## 2024-06-04 ENCOUNTER — OFFICE VISIT (OUTPATIENT)
Dept: INTERNAL MEDICINE | Facility: CLINIC | Age: 54
End: 2024-06-04
Payer: COMMERCIAL

## 2024-06-04 VITALS
HEART RATE: 86 BPM | SYSTOLIC BLOOD PRESSURE: 122 MMHG | DIASTOLIC BLOOD PRESSURE: 76 MMHG | OXYGEN SATURATION: 97 % | HEIGHT: 73 IN | BODY MASS INDEX: 20.6 KG/M2 | WEIGHT: 155.44 LBS | TEMPERATURE: 98 F

## 2024-06-04 DIAGNOSIS — Z12.11 COLON CANCER SCREENING: ICD-10-CM

## 2024-06-04 DIAGNOSIS — F31.9 BIPOLAR DISEASE, CHRONIC: ICD-10-CM

## 2024-06-04 DIAGNOSIS — F32.A DEPRESSION, UNSPECIFIED DEPRESSION TYPE: ICD-10-CM

## 2024-06-04 DIAGNOSIS — Z72.0 SMOKING TRYING TO QUIT: ICD-10-CM

## 2024-06-04 DIAGNOSIS — F17.210 CIGARETTE NICOTINE DEPENDENCE WITHOUT COMPLICATION: ICD-10-CM

## 2024-06-04 DIAGNOSIS — R68.82 LOW LIBIDO: Primary | ICD-10-CM

## 2024-06-04 PROCEDURE — 99214 OFFICE O/P EST MOD 30 MIN: CPT | Mod: S$GLB,,,

## 2024-06-04 PROCEDURE — 99999 PR PBB SHADOW E&M-EST. PATIENT-LVL V: CPT | Mod: PBBFAC,,,

## 2024-06-04 RX ORDER — NICOTINE 7MG/24HR
1 PATCH, TRANSDERMAL 24 HOURS TRANSDERMAL DAILY
Qty: 30 PATCH | Refills: 3 | Status: SHIPPED | OUTPATIENT
Start: 2024-06-04

## 2024-06-04 RX ORDER — BUPROPION HYDROCHLORIDE 300 MG/1
300 TABLET ORAL DAILY
Qty: 30 TABLET | Refills: 1 | Status: SHIPPED | OUTPATIENT
Start: 2024-06-04 | End: 2025-06-04

## 2024-06-04 RX ORDER — TADALAFIL 10 MG/1
10 TABLET ORAL DAILY PRN
Qty: 30 TABLET | Refills: 1 | Status: SHIPPED | OUTPATIENT
Start: 2024-06-04 | End: 2025-06-04

## 2024-06-04 NOTE — LETTER
June 4, 2024      [facility]  [id number] [place] [id number]  Phone: [phone]  Fax: [phone]       Patient: Darin Fulton   YOB: 1970  Date of Visit: 06/04/2024    To Whom It May Concern:    Aida Fulton  was at [facility] on 06/04/2024. The patient is currently under medical treatment for anxiety and depression.  If you have any questions or concerns, or if I can be of further assistance, please do not hesitate to contact me.    Sincerely,         April Montoya NP

## 2024-06-04 NOTE — PROGRESS NOTES
Darin Fulton  06/04/2024  [id number]    Ruslan Flower MD  Patient Care Team:  Ruslan Flower MD as PCP - General (Family Medicine)          Visit Type:an urgent visit for an existing problem     Chief Complaint:  Chief Complaint   Patient presents with    Medication Problem     Wellbutrin not working.         History of Present Illness:    History of bipolar disorder   Previously prescribed Wellbutrin 150 mg   He was not taking the been taking it daily  When he stops taking it for periods, takes a while for his body to get adjusted to the medication again        History:  Past Medical History:   Diagnosis Date    Hodgkin's disease     Ulcer      Past Surgical History:   Procedure Laterality Date    HERNIA REPAIR      MANDIBLE FRACTURE SURGERY      stomach surgery because of ulcer      WRIST SURGERY       Family History   Problem Relation Name Age of Onset    No Known Problems Mother      Hypertension Father      No Known Problems Daughter      No Known Problems Son       Social History     Socioeconomic History    Marital status: Single   Tobacco Use    Smoking status: Every Day     Current packs/day: 0.50     Types: Cigarettes    Smokeless tobacco: Never   Substance and Sexual Activity    Alcohol use: Yes     Comment: socially    Drug use: Yes     Types: Marijuana     Social Determinants of Health     Financial Resource Strain: Low Risk  (2/7/2024)    Overall Financial Resource Strain (CARDIA)     Difficulty of Paying Living Expenses: Not very hard   Food Insecurity: Food Insecurity Present (2/7/2024)    Hunger Vital Sign     Worried About Running Out of Food in the Last Year: Sometimes true     Ran Out of Food in the Last Year: Never true   Transportation Needs: No Transportation Needs (2/7/2024)    PRAPARE - Transportation     Lack of Transportation (Medical): No     Lack of Transportation (Non-Medical): No   Physical Activity: Sufficiently Active (2/7/2024)    Exercise Vital Sign     Days of Exercise  per Week: 5 days     Minutes of Exercise per Session: 30 min   Stress: Stress Concern Present (2/7/2024)    [employer] - Occupational Stress Questionnaire     Feeling of Stress : Rather much   Housing Stability: High Risk (2/7/2024)    Housing Stability Vital Sign     Unable to Pay for Housing in the Last Year: Yes     Number of Places Lived in the Last Year: 1     Unstable Housing in the Last Year: No     Patient Active Problem List   Diagnosis    Large cell lymphoma    Umbilical hernia    Lipoma    Immunization deficiency    Bipolar disease, chronic     Review of patient's allergies indicates:  No Known Allergies    The following were reviewed at this visit: active problem list, medication list, allergies, family history, social history, and health maintenance.    Medications:  Current Outpatient Medications on File Prior to Visit   Medication Sig Dispense Refill    buPROPion (WELLBUTRIN XL) 150 MG TB24 tablet Take 1 tablet (150 mg total) by mouth once daily. 30 tablet 11    nicotine (NICODERM CQ) 7 mg/24 hr Place 1 patch onto the skin once daily. 14 patch 0     No current facility-administered medications on file prior to visit.       Medications have been reviewed and reconciled with patient at this visit.  Barriers to medications reviewed with patient.    Adverse reactions to current medications reviewed with patient..    Over the counter medications reviewed and reconciled with patient.    Exam:  Wt Readings from Last 3 Encounters:   02/27/24 74.8 kg (165 lb)   02/20/24 71.8 kg (158 lb 4.6 oz)   02/14/24 71 kg (156 lb 8.4 oz)     Temp Readings from Last 3 Encounters:   02/14/24 97.8 °F (36.6 °C) (Tympanic)   02/13/23 98.5 °F (36.9 °C) (Tympanic)   09/14/21 98.3 °F (36.8 °C) (Tympanic)     BP Readings from Last 3 Encounters:   02/27/24 (!) 154/95   02/20/24 (!) 165/94   02/14/24 130/74     Pulse Readings from Last 3 Encounters:   02/27/24 78   02/20/24 82   02/14/24 96     There is  no height or weight on file to calculate BMI.      Review of Systems   Cardiovascular:  Negative for chest pain and palpitations.   Psychiatric/Behavioral:  Positive for depression. Negative for suicidal ideas.      Physical Exam  Nursing note reviewed.   HENT:      Head: Normocephalic and atraumatic.   Cardiovascular:      Rate and Rhythm: Normal rate and regular rhythm.      Heart sounds: Normal heart sounds.   Pulmonary:      Effort: Pulmonary effort is normal. No respiratory distress.      Breath sounds: Normal breath sounds.   Neurological:      Mental Status: He is alert and oriented to person, place, and time.   Psychiatric:         Mood and Affect: Mood normal.         Behavior: Behavior normal.         Thought Content: Thought content normal.         Judgment: Judgment normal.         Laboratory Reviewed ({Yes)  Lab Results   Component Value Date    WBC 7.85 02/14/2024    HGB 12.2 (L) 02/14/2024    HCT 38.3 (L) 02/14/2024     02/14/2024    CHOL 156 02/14/2024    TRIG 87 02/14/2024    HDL 57 02/14/2024    ALT 26 02/14/2024    AST 29 02/14/2024     02/14/2024    K 4.0 02/14/2024     02/14/2024    CREATININE 1.0 02/14/2024    BUN 12 02/14/2024    CO2 24 02/14/2024    TSH 0.344 (L) 02/14/2024    PSA 1.1 02/14/2024    HGBA1C 5.6 02/14/2024     Darin was seen today for medication problem.    Diagnoses and all orders for this visit:    Low libido  -     tadalafiL (CIALIS) 10 MG tablet; Take 1 tablet (10 mg total) by mouth daily as needed for Erectile Dysfunction.    Depression, unspecified depression type  -     buPROPion (WELLBUTRIN XL) 300 MG 24 hr tablet; Take 1 tablet (300 mg total) by mouth once daily.    Bipolar disease, chronic    Colon cancer screening  -     Ambulatory referral/consult to Endo Procedure ; Future    Cigarette nicotine dependence without complication  -     Ambulatory referral/consult to Smoking Cessation Program; Future  -     nicotine (NICODERM CQ) 7 mg/24 hr;  Place 1 patch onto the skin once daily.    Smoking trying to quit  -     Ambulatory referral/consult to Smoking Cessation Program; Future  -     nicotine (NICODERM CQ) 7 mg/24 hr; Place 1 patch onto the skin once daily.          Pt states having problem with low libido  Will try to get cilasis approved     Common side effects are nausea, abdominal cramping, headache and dizziness but usually resolve within 1-2 weeks. Max effect of medication usually takes 4-6 weeks.     Black box warning on this medication:   If you start feeling suicidal or that you feel you want to harm yourself, please stop medication and go to your nearest emergency room for evaluation.      Medicine check in 4-6 weeks  with myself or Dr. Flower     Pt has an appt with Psych in Sept for med management     Interested in smoking cessation  Increase dose of Wellbutrin  Refer to smoking cessation  Refill nicotine patches       Care Plan/Goals: Reviewed    Goals    None         Follow up: No follow-ups on file.    After visit summary was printed and given to patient upon discharge today.  Patient goals and care plan are included in After Visit Summary.

## 2024-07-24 ENCOUNTER — TELEPHONE (OUTPATIENT)
Dept: SMOKING CESSATION | Facility: CLINIC | Age: 54
End: 2024-07-24
Payer: COMMERCIAL

## 2024-07-24 DIAGNOSIS — F32.A DEPRESSION, UNSPECIFIED DEPRESSION TYPE: ICD-10-CM

## 2024-07-24 NOTE — TELEPHONE ENCOUNTER
No care due was identified.  [facility] Embedded Care Due Messages. Reference number: [id number].   7/24/2024 3:10:23 PM CDT

## 2024-07-24 NOTE — TELEPHONE ENCOUNTER
Called patient to reschedule his intake visit. No answer/left message to call [facility] and reschedule.

## 2024-07-24 NOTE — TELEPHONE ENCOUNTER
Refill Routing Note   Medication(s) are not appropriate for processing by [facility] for the following reason(s):        No active prescription written by provider  New or recently adjusted medication  Patient not seen by provider within 15 months    [facility] action(s):  Defer      Medication Therapy Plan: The provider responsible for managing the medication isnt the PCP      Appointments  past 12m or future 3m with PCP    Date Provider   Last Visit   2/13/2023 Ruslan Flower MD   Next Visit   Visit date not found Ruslan Flower MD   ED visits in past 90 days: 0        Note composed:3:12 PM 07/24/2024

## 2024-07-25 RX ORDER — BUPROPION HYDROCHLORIDE 300 MG/1
300 TABLET ORAL DAILY
Qty: 30 TABLET | Refills: 1 | Status: SHIPPED | OUTPATIENT
Start: 2024-07-25 | End: 2025-07-25

## 2024-08-29 ENCOUNTER — TELEPHONE (OUTPATIENT)
Dept: PSYCHIATRY | Facility: CLINIC | Age: 54
End: 2024-08-29
Payer: COMMERCIAL

## 2024-08-29 ENCOUNTER — PATIENT MESSAGE (OUTPATIENT)
Dept: PSYCHIATRY | Facility: CLINIC | Age: 54
End: 2024-08-29
Payer: COMMERCIAL

## 2024-08-30 ENCOUNTER — TELEPHONE (OUTPATIENT)
Dept: PSYCHIATRY | Facility: CLINIC | Age: 54
End: 2024-08-30
Payer: COMMERCIAL

## 2024-09-13 ENCOUNTER — TELEPHONE (OUTPATIENT)
Dept: PSYCHIATRY | Facility: CLINIC | Age: 54
End: 2024-09-13
Payer: COMMERCIAL

## 2024-09-17 ENCOUNTER — OFFICE VISIT (OUTPATIENT)
Dept: INTERNAL MEDICINE | Facility: CLINIC | Age: 54
End: 2024-09-17
Payer: COMMERCIAL

## 2024-09-17 VITALS
OXYGEN SATURATION: 100 % | BODY MASS INDEX: 20.43 KG/M2 | WEIGHT: 154.13 LBS | HEIGHT: 73 IN | DIASTOLIC BLOOD PRESSURE: 80 MMHG | HEART RATE: 96 BPM | SYSTOLIC BLOOD PRESSURE: 120 MMHG

## 2024-09-17 DIAGNOSIS — Z02.89 ENCOUNTER FOR COMPLETION OF FORM WITH PATIENT: ICD-10-CM

## 2024-09-17 DIAGNOSIS — F48.9 MENTAL HEALTH PROBLEM: ICD-10-CM

## 2024-09-17 DIAGNOSIS — F31.9 BIPOLAR DISEASE, CHRONIC: Primary | ICD-10-CM

## 2024-09-17 PROCEDURE — 99999 PR PBB SHADOW E&M-EST. PATIENT-LVL IV: CPT | Mod: PBBFAC,,,

## 2024-09-17 PROCEDURE — G2211 COMPLEX E/M VISIT ADD ON: HCPCS | Mod: S$GLB,,,

## 2024-09-17 PROCEDURE — 99215 OFFICE O/P EST HI 40 MIN: CPT | Mod: S$GLB,,,

## 2024-09-17 NOTE — PROGRESS NOTES
Darin Fulton  09/17/2024  [id number]    Ruslan Flower MD  Patient Care Team:  Ruslan Flower MD as PCP - General (Family Medicine)          Visit Type:an urgent visit for a new problem    Chief Complaint:  Chief Complaint   Patient presents with    Mental Health Problem          History of Present Illness:    History of Present Illness    CHIEF COMPLAINT:  Mr. Fulton presents today for follow up on bipolar disorder and medication management.    BIPOLAR DISORDER AND MEDICATION MANAGEMENT:  He has a questionable history of bipolar disorder and is currently prescribed Wellbutrin. He reports medication non-compliance, citing difficulty maintaining a consistent dosing schedule due to lack of appetite in the morning and the need to take it with food. He has attempted taking it with dinner but still struggles with consistency. He reports that Wellbutrin sometimes intensifies his symptoms, especially when doses are missed. He expresses a desire to establish a more effective medication schedule and is open to exploring alternative medications if necessary.    MENTAL HEALTH SYMPTOMS:  He reports experiencing emotional fluctuations, episodes of anger, anxiety, and depression since an incident last year when he was found unresponsive and hospitalized at [facility]. The duration of time he was unresponsive is unknown, raising concerns about potential oxygen deprivation to the brain. He describes having periods where he feels fine but can suddenly enter a mental health crisis characterized by emotional instability and anger. He has a history of suicidal thoughts but denies any current suicidal ideation or thoughts of harming others. The frequency and severity of these episodes are impacting his daily life and employment.    WORK AND DISABILITY:  He reports being at risk of losing his job of 20 years due to his current health condition. He recently took a week off work due to symptoms and is seeking FMLA or  short-term disability. He requests continuous leave until October 2nd, followed by intermittent leave to accommodate potential appointments and symptom flare-ups while maintaining some work engagement.    TREATMENT PREFERENCES:  He expresses interest in extensive testing, including neurological evaluation, to understand the underlying causes of symptoms before continuing medication. He desires to address the root of his condition rather than solely relying on medication management. He is also interested in therapy services for mental health support and to demonstrate to his employer that he is actively pursuing treatment.    SOCIAL HISTORY:  He is  and has been with his spouse for 20 years. He has three children.      ROS:  General: -fever, -chills, -fatigue, -weight gain, -weight loss  Eyes: -vision changes, -redness, -discharge  ENT: -ear pain, -nasal congestion, -sore throat  Cardiovascular: -chest pain, -palpitations, -lower extremity edema  Respiratory: -cough, -shortness of breath  Gastrointestinal: -abdominal pain, -nausea, -vomiting, -diarrhea, -constipation, -blood in stool  Genitourinary: -dysuria, -hematuria, -frequency  Musculoskeletal: -joint pain, -muscle pain  Skin: -rash, -lesion  Neurological: -headache, -dizziness, -numbness, -tingling  Psychiatric: +anxiety, +depression, -sleep difficulty, -suicidal ideation      History of Present Illness    CHIEF COMPLAINT:  Mr. Fulton presents today for follow up on bipolar disorder and medication management.    BIPOLAR DISORDER AND MEDICATION MANAGEMENT:  He has a history of bipolar disorder and is currently prescribed Wellbutrin. He reports medication non-compliance, citing difficulty maintaining a consistent dosing schedule due to lack of appetite in the morning and the need to take it with food. He has attempted taking it with dinner but still struggles with consistency. He reports that Wellbutrin sometimes intensifies his symptoms, especially when  doses are missed. He expresses a desire to establish a more effective medication schedule and is open to exploring alternative medications if necessary.    MENTAL HEALTH SYMPTOMS:  He reports experiencing emotional fluctuations, episodes of anger, anxiety, and depression since an incident last year when he was found unresponsive and hospitalized at [facility]. The duration of time he was unresponsive is unknown, raising concerns about potential oxygen deprivation to the brain. He describes having periods where he feels fine but can suddenly enter a mental health crisis characterized by emotional instability and anger. He has a history of suicidal thoughts but denies any current suicidal ideation or thoughts of harming others. The frequency and severity of these episodes are impacting his daily life and employment.    WORK AND DISABILITY:  He reports being at risk of losing his job of 20 years due to his current health condition. He recently took a week off work due to symptoms and is seeking FMLA or short-term disability. He requests continuous leave until October 2nd, followed by intermittent leave to accommodate potential appointments and symptom flare-ups while maintaining some work engagement.    TREATMENT PREFERENCES:  He expresses interest in extensive testing, including neurological evaluation, to understand the underlying causes of symptoms before continuing medication. He desires to address the root of his condition rather than solely relying on medication management. He is also interested in therapy services for mental health support and to demonstrate to his employer that he is actively pursuing treatment.    SOCIAL HISTORY:  He is  and has been with his spouse for 20 years. He has three children.      ROS:  General: -fever, -chills, -fatigue, -weight gain, -weight loss  Eyes: -vision changes, -redness, -discharge  ENT: -ear pain, -nasal congestion, -sore throat  Cardiovascular: -chest  pain, -palpitations, -lower extremity edema  Respiratory: -cough, -shortness of breath  Gastrointestinal: -abdominal pain, -nausea, -vomiting, -diarrhea, -constipation, -blood in stool  Genitourinary: -dysuria, -hematuria, -frequency  Musculoskeletal: -joint pain, -muscle pain  Skin: -rash, -lesion  Neurological: -headache, -dizziness, -numbness, -tingling  Psychiatric: +anxiety, +depression, -sleep difficulty, -suicidal ideation            History:  Past Medical History:   Diagnosis Date    Hodgkin's disease     Ulcer      Past Surgical History:   Procedure Laterality Date    HERNIA REPAIR      MANDIBLE FRACTURE SURGERY      stomach surgery because of ulcer      WRIST SURGERY       Family History   Problem Relation Name Age of Onset    No Known Problems Mother      Hypertension Father      No Known Problems Daughter      No Known Problems Son       Social History     Socioeconomic History    Marital status: Single   Tobacco Use    Smoking status: Every Day     Current packs/day: 0.50     Types: Cigarettes    Smokeless tobacco: Never   Substance and Sexual Activity    Alcohol use: Yes     Comment: socially    Drug use: Yes     Types: Marijuana     Social Determinants of Health     Financial Resource Strain: Low Risk  (2/7/2024)    Overall Financial Resource Strain (CARDIA)     Difficulty of Paying Living Expenses: Not very hard   Food Insecurity: Food Insecurity Present (2/7/2024)    Hunger Vital Sign     Worried About Running Out of Food in the Last Year: Sometimes true     Ran Out of Food in the Last Year: Never true   Transportation Needs: No Transportation Needs (2/7/2024)    PRAPARE - Transportation     Lack of Transportation (Medical): No     Lack of Transportation (Non-Medical): No   Physical Activity: Sufficiently Active (2/7/2024)    Exercise Vital Sign     Days of Exercise per Week: 5 days     Minutes of Exercise per Session: 30 min   Stress: Stress Concern Present (2/7/2024)    [employer] [place] [employer]  [employer] - Occupational Stress Questionnaire     Feeling of Stress : Rather much   Housing Stability: High Risk (2/7/2024)    Housing Stability Vital Sign     Unable to Pay for Housing in the Last Year: Yes     Number of Places Lived in the Last Year: 1     Unstable Housing in the Last Year: No     Patient Active Problem List   Diagnosis    Large cell lymphoma    Umbilical hernia    Lipoma    Immunization deficiency    Bipolar disease, chronic     Review of patient's allergies indicates:  No Known Allergies    The following were reviewed at this visit: active problem list, medication list, allergies, family history, social history, and health maintenance.    Medications:  Current Outpatient Medications on File Prior to Visit   Medication Sig Dispense Refill    buPROPion (WELLBUTRIN XL) 300 MG 24 hr tablet Take 1 tablet (300 mg total) by mouth once daily. 30 tablet 1    nicotine (NICODERM CQ) 7 mg/24 hr Place 1 patch onto the skin once daily. 30 patch 3    tadalafiL (CIALIS) 10 MG tablet Take 1 tablet (10 mg total) by mouth daily as needed for Erectile Dysfunction. 30 tablet 1     No current facility-administered medications on file prior to visit.       Medications have been reviewed and reconciled with patient at this visit.  Barriers to medications reviewed with patient.    Adverse reactions to current medications reviewed with patient..    Over the counter medications reviewed and reconciled with patient.    Exam:  Wt Readings from Last 3 Encounters:   09/17/24 69.9 kg (154 lb 1.6 oz)   06/04/24 70.5 kg (155 lb 6.8 oz)   02/27/24 74.8 kg (165 lb)     Temp Readings from Last 3 Encounters:   06/04/24 98.1 °F (36.7 °C) (Tympanic)   02/14/24 97.8 °F (36.6 °C) (Tympanic)   02/13/23 98.5 °F (36.9 °C) (Tympanic)     BP Readings from Last 3 Encounters:   09/17/24 120/80   06/04/24 122/76   02/27/24 (!) 154/95     Pulse Readings from Last 3 Encounters:   09/17/24 96   06/04/24 86   02/27/24 78     Body mass  index is 20.33 kg/m².    Physical Exam  Nursing note reviewed.   HENT:      Head: Normocephalic and atraumatic.   Cardiovascular:      Rate and Rhythm: Normal rate and regular rhythm.      Heart sounds: Normal heart sounds.   Pulmonary:      Effort: Pulmonary effort is normal. No respiratory distress.      Breath sounds: Normal breath sounds.   Neurological:      Mental Status: He is alert.   Psychiatric:         Behavior: Behavior is withdrawn.           Laboratory Reviewed ({Yes)  Lab Results   Component Value Date    WBC 7.85 02/14/2024    HGB 12.2 (L) 02/14/2024    HCT 38.3 (L) 02/14/2024     02/14/2024    CHOL 156 02/14/2024    TRIG 87 02/14/2024    HDL 57 02/14/2024    ALT 26 02/14/2024    AST 29 02/14/2024     02/14/2024    K 4.0 02/14/2024     02/14/2024    CREATININE 1.0 02/14/2024    BUN 12 02/14/2024    CO2 24 02/14/2024    TSH 0.344 (L) 02/14/2024    PSA 1.1 02/14/2024    HGBA1C 5.6 02/14/2024       Darin was seen today for mental health problem.    Diagnoses and all orders for this visit:    Bipolar disease, chronic  -     Ambulatory referral/consult to Adult Neuropsychology; Future  -     Ambulatory referral/consult to Psychiatry; Future          Assessment & Plan    BIPOLAR DISORDER:  - Assessed patient's history of bipolar disorder and recent mental health crisis.  - Considered impact of inconsistent Wellbutrin use on mood control.  - Explained that psychiatric medications often require trial and error to find the most effective option.  - Discussed importance of consistent daily medication use to avoid symptom intensification.  - Take daily with food, preferably with dinner.  - Will attempt to expedite psychiatry appointment.  During the appt was able to find an extremal psych provider that has openings     MENTAL HEALTH CRISIS:  - Evaluated need for time off work due to mental health symptoms.  - Mr. Fulton to upload FMLA form to [facility] for completion.  - Follow up to   completed [place] paperwork.  - Follow up virtually if leave needs to be extended beyond October 2nd.    NEUROLOGICAL CHANGES:  - Determined need for comprehensive neuropsychological evaluation due to changes in mental status since patient was found unresponsive last year.  - Referred to neuropsychology for evaluation.    THERAPY REFERRAL:  - Referred for therapy.       During the visit, was able to find a mental health provider that has appointments this week  Will place an order for a urine drug screen.    Will refer to Neuropsych as well     The patient's wife stated that erratic behaviors started after being found unresponsive a year or 2 ago. The wife states that she did not even find out what caused him to be unresponsive,  when the patient comes to  his FMLA paperwork, will have the pt sign a CAROLINA to get records from [facility]       Visit today included increased complexity associated with the care of the episodic problem mental health , which was addressed while instituting co-management of the longitudinal care of the patient due to the serious and/or complex managed problem(s) .    I have evaluated and discussed management associated with medical care services that serve as the continuing focal point for all needed health care services and/or with medical care services that are part of ongoing care related to my patient's single, serious condition or a complex condition(s).    I am providing ongoing care and I am the primary care provider for this patient, and they are being managed, monitored, and/or observed for their chronic conditions over time.     I have addressed their ongoing health maintenance requirements and needs for all health care services and reviewed co-management plans provided by specialty providers when available.    Health Maintenance Due   Topic Date Due    COVID-19 Vaccine (1) Never done    Pneumococcal Vaccines (Age 0-64) (1 of 2 - PCV) Never done    TETANUS VACCINE  Never done     Shingles Vaccine (1 of 2) Never done    Colorectal Cancer Screening  04/11/2022        Care Plan/Goals: Reviewed    Goals    None         Follow up: No follow-ups on file.    After visit summary was printed and given to patient upon discharge today.  Patient goals and care plan are included in After Visit Summary.      Answers submitted by the patient for this visit:  Review of Systems Questionnaire (Submitted on 9/15/2024)  activity change: No  unexpected weight change: No  neck pain: No  hearing loss: No  rhinorrhea: No  trouble swallowing: No  eye discharge: No  visual disturbance: Yes  chest tightness: No  wheezing: No  chest pain: No  palpitations: No  blood in stool: No  constipation: No  vomiting: No  diarrhea: No  polydipsia: Yes  polyuria: No  difficulty urinating: No  urgency: No  hematuria: No  joint swelling: No  arthralgias: No  headaches: No  weakness: No  confusion: Yes  dysphoric mood: Yes

## 2024-09-18 ENCOUNTER — PATIENT MESSAGE (OUTPATIENT)
Dept: INTERNAL MEDICINE | Facility: CLINIC | Age: 54
End: 2024-09-18
Payer: COMMERCIAL

## 2024-09-19 ENCOUNTER — TELEPHONE (OUTPATIENT)
Dept: INTERNAL MEDICINE | Facility: CLINIC | Age: 54
End: 2024-09-19
Payer: COMMERCIAL

## 2024-09-19 NOTE — TELEPHONE ENCOUNTER
Returned call to patient regarding forms emailed to April Montoya NP, Patient advised COOPER [place]ajo out for the day and will return on tomorrow morning. Patient advised once formes are printed and completed he will receive a call to pick them up.

## 2024-09-20 RX ORDER — ARIPIPRAZOLE 5 MG/1
5 TABLET ORAL DAILY
Qty: 30 TABLET | Refills: 11 | Status: SHIPPED | OUTPATIENT
Start: 2024-09-20 | End: 2025-09-20

## 2024-09-23 ENCOUNTER — PATIENT MESSAGE (OUTPATIENT)
Dept: INTERNAL MEDICINE | Facility: CLINIC | Age: 54
End: 2024-09-23
Payer: COMMERCIAL

## 2024-09-24 ENCOUNTER — PATIENT MESSAGE (OUTPATIENT)
Dept: INTERNAL MEDICINE | Facility: CLINIC | Age: 54
End: 2024-09-24
Payer: COMMERCIAL

## 2024-09-25 ENCOUNTER — TELEPHONE (OUTPATIENT)
Dept: INTERNAL MEDICINE | Facility: CLINIC | Age: 54
End: 2024-09-25
Payer: COMMERCIAL

## 2024-09-25 NOTE — TELEPHONE ENCOUNTER
"DUKE faxed to [facility] [[email or web address]] at [phone]        Your fax has been successfully sent to [phone] at [phone].    9/25/2024 11:15:57 AM Transmission Record   Sent to [phone] with remote ID "[id number]"   Result: (0/339;0/0) Success   Page record: 1 - 11   Elapsed time: 04:54 on channel 1    "

## 2024-09-26 ENCOUNTER — TELEPHONE (OUTPATIENT)
Dept: INTERNAL MEDICINE | Facility: CLINIC | Age: 54
End: 2024-09-26
Payer: COMMERCIAL

## 2024-09-26 NOTE — TELEPHONE ENCOUNTER
Faxed CAROLINA to [facility] on [employer] to [phone]    Your fax has been successfully sent to [phone] at [phone].    9/26/2024 3:41:23 PM Transmission Record   Sent to [phone] with remote ID "   Result: (0/339;0/0) Success   Page record: 1 - 2   Elapsed time: 01:20 on channel 2

## 2024-10-09 ENCOUNTER — TELEPHONE (OUTPATIENT)
Dept: PSYCHIATRY | Facility: CLINIC | Age: 54
End: 2024-10-09
Payer: COMMERCIAL

## 2024-11-04 ENCOUNTER — TELEPHONE (OUTPATIENT)
Dept: PSYCHIATRY | Facility: CLINIC | Age: 54
End: 2024-11-04
Payer: COMMERCIAL

## 2024-11-05 NOTE — TELEPHONE ENCOUNTER
----- Message from Summer sent at 11/4/2024 12:38 PM CST -----  Contact: Lucinda/wife  Lucinda is needing a call back to reschedule her  appointment today. Please call back at [phone].

## 2024-11-27 ENCOUNTER — PATIENT MESSAGE (OUTPATIENT)
Dept: INTERNAL MEDICINE | Facility: CLINIC | Age: 54
End: 2024-11-27
Payer: COMMERCIAL

## 2024-11-27 NOTE — LETTER
November 27, 2024      [facility]  [address] [place] [address] [address] [address]  Phone: [phone]  Fax: [phone]       Patient: Darin Fulton   YOB: 1970  Date of Visit: 11/27/2024    To Whom It May Concern:    Aida Fulton  was under the care of [facility] on 9/6/2024-9/30/2024. The patient may return to work/school on 10/01/2024 with no restrictions. If you have any questions or concerns, or if I can be of further assistance, please do not hesitate to contact me.    Sincerely,    Lynette Pierson MA

## 2024-12-10 ENCOUNTER — OFFICE VISIT (OUTPATIENT)
Dept: PSYCHIATRY | Facility: CLINIC | Age: 54
End: 2024-12-10
Payer: COMMERCIAL

## 2024-12-10 DIAGNOSIS — F39 EPISODIC MOOD DISORDER: Primary | ICD-10-CM

## 2024-12-10 DIAGNOSIS — R45.89 DEPRESSED AFFECT: ICD-10-CM

## 2024-12-10 DIAGNOSIS — F17.200 NICOTINE DEPENDENCE WITH CURRENT USE: ICD-10-CM

## 2024-12-10 PROCEDURE — 90791 PSYCH DIAGNOSTIC EVALUATION: CPT | Mod: S$GLB,,, | Performed by: SOCIAL WORKER

## 2024-12-10 PROCEDURE — 99999 PR PBB SHADOW E&M-EST. PATIENT-LVL I: CPT | Mod: PBBFAC,,, | Performed by: SOCIAL WORKER

## 2024-12-11 ENCOUNTER — TELEPHONE (OUTPATIENT)
Dept: SMOKING CESSATION | Facility: CLINIC | Age: 54
End: 2024-12-11
Payer: COMMERCIAL

## 2024-12-11 NOTE — TELEPHONE ENCOUNTER
Called patient regarding smoking cessation.  Left message for patient to call back at [phone]

## 2024-12-18 NOTE — PROGRESS NOTES
"Psychiatry Initial Visit (PhD/LCSW)  Diagnostic Interview - CPT 53049    Date: 12/10/2024    Site: [place]    Referral source: Aaareferral Self    Clinical status of patient: Outpatient    Darin Fulton, a 54 y.o. male, for initial evaluation visit.  Met with patient.    Chief complaint/reason for encounter: addictive disorder, depression, mood swings, and anger    History of present illness:  Late entry for 12/10/24.  54 year old male patient presenting for initial assessment; self-referred, but this was not his first attempt to see someone. First recorded attempts goes back to February of 2013 when he scheduled to meet with a counselor but did not follow through. He then renewed his effort 11 years later, August of 2024 but missed his appointment. Patient chief complaint is of depressive mood symptoms, including years of chronic low energy, loss of drive and motivation "all [his] life," worsening the past 3 or 4 years; endorsed low frustration tolerance and frequent irritability. Unclear of actual ngoc or hypomania; more aware of depressive episodes but did endorse the irritability, low frustration tolerance and impatience; he also reported a recent diagnosis of bipolar disorder and prescription lithium twice daily for the past 2 months, as well as a medical marijuana card for the same period of time. Finally, the patient reported he has been a half-pack a day cigarette smoker for many years, staring at age 16, and he has wanted to quit for several years. Patient denied any suicidal or homicidal ideation, psychosis, cognitive deficit, or substance abuse, aside from the tobacco. Considers the marijuana medicinal, as it helps calm him. Identified therapeutic goals include reducing mood symptoms and anxiety and improving healthy coping skills, including alternatives to smoking. Patient has appointment for 3/10/25 but intends to return sooner, within a month, as able, tba    Pain:  " "noncontributory    Symptoms:   Mood: depressed mood, diminished interest, poor concentration, lack of motivation, and changeability of mood  Anxiety: excessive anxiety/worry, irritability, and loss of frustration tolerance  Substance abuse: tolerance and dependence on nicotine  Cognitive functioning: denied  Health behaviors:  smoking    Psychiatric history: psychotropic management by PCP    Medical history: ulcer; history of Hodgkin's disease    Family history of psychiatric illness: none    Social history (marriage, employment, etc.):  born in [facility]. Has a younger sister by 10 years. Raised by both biological parents; both worked a lot; patient was around numerous cousins, aunts and uncles "a lot." Described chilhood as full and happy. School was socially great but academically he reported struggle. Dropped out of 10th grade but did obtain his GED at age 17. 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 He reports being a father of 5, three of them from his marital relationship of the past 14 years; together since 2010;  since 2020. Together the couple have a 13 year old son on the autism spectrum, a 2 year old son, and a 10 month old daughter. His other children or significantly older, daughter 32 and son 26.    Substance use:   Alcohol: social   Drugs: medical mariuana card/use   Tobacco: daily, half pack a day habit, from age 16 to present; approximately 19 pack years.   Caffeine: not reported    Current medications and drug reactions (include OTC, herbal): see medication list      Strengths and liabilities: Strength: Patient accepts guidance/feedback, Strength: Patient is expressive/articulate., Strength: Patient is motivated for change., Strength: Patient is physically healthy., Strength: Patient has positive support network., Liability: Patient lacks coping skills.    Current Evaluation:     Mental " Status Exam:  General Appearance:  unremarkable, age appropriate, casually dressed   Speech: normal tone, normal rate, normal pitch, normal volume      Level of Cooperation: cooperative      Thought Processes: goal-directed   Mood: anxious, depressed, irritable      Thought Content: normal, no suicidality, no homicidality, delusions, or paranoia   Affect: congruent and appropriate   Orientation: Oriented x3   Memory: Recent and remote memory intact   Attention Span & Concentration: intact   Fund of General Knowledge: intact and appropriate to age and level of education   Abstract Reasoning: Not formally assessed   Judgment & Insight: limited     Language  intact     Diagnostic Impression - Plan:       ICD-10-CM ICD-9-CM   1. Episodic mood disorder  F39 296.90   2. Depressed affect  R45.89 311   3. Nicotine dependence with current use  F17.200 305.1       Plan:individual psychotherapy and medication management by physician    Return to Clinic: as scheduled; sooner as able, as soon as 1 month, tba    Length of Service (minutes):  50

## 2025-03-10 ENCOUNTER — OFFICE VISIT (OUTPATIENT)
Dept: PSYCHIATRY | Facility: CLINIC | Age: 55
End: 2025-03-10
Payer: COMMERCIAL

## 2025-03-10 DIAGNOSIS — F17.200 NICOTINE DEPENDENCE WITH CURRENT USE: ICD-10-CM

## 2025-03-10 DIAGNOSIS — F39 EPISODIC MOOD DISORDER: Primary | ICD-10-CM

## 2025-03-10 DIAGNOSIS — R45.89 DEPRESSED AFFECT: ICD-10-CM

## 2025-03-10 PROCEDURE — 99999 PR PBB SHADOW E&M-EST. PATIENT-LVL I: CPT | Mod: PBBFAC,,, | Performed by: SOCIAL WORKER

## 2025-03-16 NOTE — PROGRESS NOTES
"Individual Psychotherapy (PhD/LCSW)    3/10/2025    [facility]:   [facility]     Therapeutic Intervention: Met with patient.  Outpatient - Insight oriented psychotherapy 45 min - CPT code 21841 and Outpatient - Supportive psychotherapy 45 min - CPT Code 68254    Chief complaint/reason for encounter: addictive disorder and depression     Interval history and content of current session: Late entry for 3/10/25. 54 year old male patient returning to clinic for follow up psychotherapy to address mood, mostly depressive, and concern about nicotine addiction. Patient is  to wife, 41, since 2020, three children together, ages 13, 2, and 1. The older two are boys, the oldest on the autism spectrum. Patient also has 2 older children ages 32 and 26. Patient reporting the entire previous week was vacation for him. Returning to work 3/12/25. Feeling sleep deprived from binge watching shows much of the night before. Patient endorsed continued "impasse" with his wife, both wanting more understanding from each other. He works full-time for a [employer]. She is a full-time homemaker with 2 children in diapers. Patient denied any si/hi, mood swings, or substance abuse. Supportive therapy provided. Follow up scheduled 4/1/25. Patient may schedule something sooner, tba.    Treatment plan:  Target symptoms: depression, mood disorder, nicotine addiction  Why chosen therapy is appropriate versus another modality: relevant to diagnosis; patient responsive to this modality.  Outcome monitoring methods:  self-report; observation.  Therapeutic intervention type: insight-oriented psychotherapy; supportive psychotherapy.    Risk parameters:  Patient reports no suicidal ideation  Patient reports no homicidal ideation  Patient reports no self-injurious behavior  Patient reports no violent behavior    Verbal deficits: None    Patient's response to intervention:  The patient's response to intervention is accepting    Progress toward goals and other " mental status changes:  The patient's progress toward goals is  establishing therapeutic rapport .    Diagnosis:     ICD-10-CM ICD-9-CM   1. Episodic mood disorder  F39 296.90   2. Depressed affect  R45.89 311   3. Nicotine dependence with current use  F17.200 305.1       Plan:  individual psychotherapy    Return to clinic: as scheduled    Length of Service (minutes): 45

## 2025-05-06 ENCOUNTER — OFFICE VISIT (OUTPATIENT)
Dept: INTERNAL MEDICINE | Facility: CLINIC | Age: 55
End: 2025-05-06
Payer: COMMERCIAL

## 2025-05-06 ENCOUNTER — LAB VISIT (OUTPATIENT)
Dept: LAB | Facility: HOSPITAL | Age: 55
End: 2025-05-06
Payer: COMMERCIAL

## 2025-05-06 ENCOUNTER — RESULTS FOLLOW-UP (OUTPATIENT)
Dept: INTERNAL MEDICINE | Facility: CLINIC | Age: 55
End: 2025-05-06

## 2025-05-06 VITALS
HEIGHT: 73 IN | TEMPERATURE: 96 F | OXYGEN SATURATION: 99 % | SYSTOLIC BLOOD PRESSURE: 138 MMHG | BODY MASS INDEX: 20.45 KG/M2 | HEART RATE: 83 BPM | RESPIRATION RATE: 16 BRPM | WEIGHT: 154.31 LBS | DIASTOLIC BLOOD PRESSURE: 88 MMHG

## 2025-05-06 DIAGNOSIS — Z00.00 ANNUAL PHYSICAL EXAM: ICD-10-CM

## 2025-05-06 DIAGNOSIS — D64.9 ANEMIA, UNSPECIFIED TYPE: Primary | ICD-10-CM

## 2025-05-06 DIAGNOSIS — Z12.11 COLON CANCER SCREENING: ICD-10-CM

## 2025-05-06 DIAGNOSIS — F39 EPISODIC MOOD DISORDER: ICD-10-CM

## 2025-05-06 DIAGNOSIS — Z00.00 ANNUAL PHYSICAL EXAM: Primary | ICD-10-CM

## 2025-05-06 DIAGNOSIS — R45.89 DEPRESSED AFFECT: ICD-10-CM

## 2025-05-06 LAB
ABSOLUTE EOSINOPHIL (OHS): 0.18 K/UL
ABSOLUTE MONOCYTE (OHS): 0.72 K/UL (ref 0.3–1)
ABSOLUTE NEUTROPHIL COUNT (OHS): 3.92 K/UL (ref 1.8–7.7)
ALBUMIN SERPL BCP-MCNC: 3.7 G/DL (ref 3.5–5.2)
ALP SERPL-CCNC: 112 UNIT/L (ref 40–150)
ALT SERPL W/O P-5'-P-CCNC: 16 UNIT/L (ref 10–44)
ANION GAP (OHS): 8 MMOL/L (ref 8–16)
AST SERPL-CCNC: 22 UNIT/L (ref 11–45)
BASOPHILS # BLD AUTO: 0.06 K/UL
BASOPHILS NFR BLD AUTO: 0.9 %
BILIRUB SERPL-MCNC: 0.3 MG/DL (ref 0.1–1)
BUN SERPL-MCNC: 12 MG/DL (ref 6–20)
CALCIUM SERPL-MCNC: 9.5 MG/DL (ref 8.7–10.5)
CHLORIDE SERPL-SCNC: 104 MMOL/L (ref 95–110)
CHOLEST SERPL-MCNC: 158 MG/DL (ref 120–199)
CHOLEST/HDLC SERPL: 2.5 {RATIO} (ref 2–5)
CO2 SERPL-SCNC: 26 MMOL/L (ref 23–29)
CREAT SERPL-MCNC: 0.9 MG/DL (ref 0.5–1.4)
ERYTHROCYTE [DISTWIDTH] IN BLOOD BY AUTOMATED COUNT: 22.7 % (ref 11.5–14.5)
GFR SERPLBLD CREATININE-BSD FMLA CKD-EPI: >60 ML/MIN/1.73/M2
GLUCOSE SERPL-MCNC: 88 MG/DL (ref 70–110)
HCT VFR BLD AUTO: 32.4 % (ref 40–54)
HDLC SERPL-MCNC: 62 MG/DL (ref 40–75)
HDLC SERPL: 39.2 % (ref 20–50)
HGB BLD-MCNC: 9.4 GM/DL (ref 14–18)
IMM GRANULOCYTES # BLD AUTO: 0.01 K/UL (ref 0–0.04)
IMM GRANULOCYTES NFR BLD AUTO: 0.1 % (ref 0–0.5)
LDLC SERPL CALC-MCNC: 86.8 MG/DL (ref 63–159)
LYMPHOCYTES # BLD AUTO: 1.98 K/UL (ref 1–4.8)
MCH RBC QN AUTO: 21 PG (ref 27–31)
MCHC RBC AUTO-ENTMCNC: 29 G/DL (ref 32–36)
MCV RBC AUTO: 72 FL (ref 82–98)
NONHDLC SERPL-MCNC: 96 MG/DL
NUCLEATED RBC (/100WBC) (OHS): 0 /100 WBC
PLATELET # BLD AUTO: 360 K/UL (ref 150–450)
PMV BLD AUTO: 10.6 FL (ref 9.2–12.9)
POTASSIUM SERPL-SCNC: 4.1 MMOL/L (ref 3.5–5.1)
PROT SERPL-MCNC: 7.2 GM/DL (ref 6–8.4)
PSA SERPL-MCNC: 0.87 NG/ML
RBC # BLD AUTO: 4.48 M/UL (ref 4.6–6.2)
RELATIVE EOSINOPHIL (OHS): 2.6 %
RELATIVE LYMPHOCYTE (OHS): 28.8 % (ref 18–48)
RELATIVE MONOCYTE (OHS): 10.5 % (ref 4–15)
RELATIVE NEUTROPHIL (OHS): 57.1 % (ref 38–73)
SODIUM SERPL-SCNC: 138 MMOL/L (ref 136–145)
TRIGL SERPL-MCNC: 46 MG/DL (ref 30–150)
WBC # BLD AUTO: 6.87 K/UL (ref 3.9–12.7)

## 2025-05-06 PROCEDURE — 36415 COLL VENOUS BLD VENIPUNCTURE: CPT

## 2025-05-06 PROCEDURE — 99999 PR PBB SHADOW E&M-EST. PATIENT-LVL V: CPT | Mod: PBBFAC,,,

## 2025-05-06 PROCEDURE — 80053 COMPREHEN METABOLIC PANEL: CPT

## 2025-05-06 PROCEDURE — 99396 PREV VISIT EST AGE 40-64: CPT | Mod: S$GLB,,,

## 2025-05-06 PROCEDURE — 85025 COMPLETE CBC W/AUTO DIFF WBC: CPT

## 2025-05-06 PROCEDURE — 84153 ASSAY OF PSA TOTAL: CPT

## 2025-05-06 PROCEDURE — 80061 LIPID PANEL: CPT

## 2025-05-06 NOTE — PATIENT INSTRUCTIONS
Get intermittent leave paperwork for his job  Once he has the paperwork, schedule an appt with me (it can be virtual) to fill out the paperwork

## 2025-05-06 NOTE — PROGRESS NOTES
Darin Fulton  05/06/2025  [id number]    Ruslan Flower MD  Patient Care Team:  Ruslan Flower MD as PCP - General (Family Medicine)          Visit Type:a scheduled routine follow-up visit    Chief Complaint:  Chief Complaint   Patient presents with    Annual Exam       History of Present Illness:    History of Present Illness    CHIEF COMPLAINT:  Mr. Fulton presents today for follow up of mental health concerns.    MENTAL HEALTH:  He was found unresponsive in 2022, which was followed by the onset of strong bipolar-like symptoms. Duration of unresponsiveness is unknown. He currently sees a therapist at the clinic and a psychiatric nurse practitioner every few months. He takes OTC lithium supplement which helps with calmness and mood swings, though he reports inconsistent adherence. The patient's wife states that he is seen by psych NP every few months. Recently started seeing the therapist.           ROS:  General: -fever, -chills, -fatigue, -weight gain, -weight loss  Eyes: -vision changes, -redness, -discharge  ENT: -ear pain, +nasal congestion, -sore throat, +nasal discharge  Cardiovascular: -chest pain, -palpitations, -lower extremity edema  Respiratory: -cough, -shortness of breath  Gastrointestinal: -abdominal pain, -nausea, -vomiting, -diarrhea, -constipation, -blood in stool  Genitourinary: -dysuria, -hematuria, -frequency  Musculoskeletal: -joint pain, -muscle pain  Skin: -rash, -lesion  Neurological: -headache, -dizziness, -numbness, -tingling  Psychiatric: -anxiety, -depression, -sleep difficulty, +mood swings, +irritability            History:  Past Medical History:   Diagnosis Date    Hodgkin's disease     Ulcer      Past Surgical History:   Procedure Laterality Date    HERNIA REPAIR      MANDIBLE FRACTURE SURGERY      stomach surgery because of ulcer      WRIST SURGERY       Family History   Problem Relation Name Age of Onset    No Known Problems Mother      Hypertension Father      No Known  Problems Daughter      No Known Problems Son       Social History[1]  Problem List[2]  Review of patient's allergies indicates:  No Known Allergies    The following were reviewed at this visit: active problem list, medication list, allergies, family history, social history, and health maintenance.    Medications:  Medications Ordered Prior to Encounter[3]    Medications have been reviewed and reconciled with patient at this visit.  Barriers to medications reviewed with patient.    Adverse reactions to current medications reviewed with patient..    Over the counter medications reviewed and reconciled with patient.    Exam:  Wt Readings from Last 3 Encounters:   05/06/25 70 kg (154 lb 5.2 oz)   09/17/24 69.9 kg (154 lb 1.6 oz)   06/04/24 70.5 kg (155 lb 6.8 oz)     Temp Readings from Last 3 Encounters:   05/06/25 96.2 °F (35.7 °C) (Tympanic)   06/04/24 98.1 °F (36.7 °C) (Tympanic)   02/14/24 97.8 °F (36.6 °C) (Tympanic)     BP Readings from Last 3 Encounters:   05/06/25 138/88   09/17/24 120/80   06/04/24 122/76     Pulse Readings from Last 3 Encounters:   05/06/25 83   09/17/24 96   06/04/24 86     Body mass index is 20.36 kg/m².    Physical Exam  Nursing note reviewed.   HENT:      Head: Normocephalic and atraumatic.      Right Ear: Tympanic membrane normal.      Left Ear: Tympanic membrane normal.      Mouth/Throat:      Mouth: Mucous membranes are moist.   Cardiovascular:      Rate and Rhythm: Regular rhythm.      Heart sounds: Normal heart sounds.   Pulmonary:      Effort: Pulmonary effort is normal. No respiratory distress.      Breath sounds: Normal breath sounds.   Abdominal:      General: Bowel sounds are normal.   Neurological:      Mental Status: He is alert.   Psychiatric:         Mood and Affect: Mood normal.         Behavior: Behavior normal.         Thought Content: Thought content normal.         Judgment: Judgment normal.           Laboratory Reviewed ({Yes)  Lab Results   Component Value Date    WBC  7.85 02/14/2024    HGB 12.2 (L) 02/14/2024    HCT 38.3 (L) 02/14/2024     02/14/2024    CHOL 156 02/14/2024    TRIG 87 02/14/2024    HDL 57 02/14/2024    ALT 26 02/14/2024    AST 29 02/14/2024     02/14/2024    K 4.0 02/14/2024     02/14/2024    CREATININE 1.0 02/14/2024    BUN 12 02/14/2024    CO2 24 02/14/2024    TSH 0.344 (L) 02/14/2024    PSA 1.1 02/14/2024    HGBA1C 5.6 02/14/2024       Darin was seen today for annual exam.    Diagnoses and all orders for this visit:    Annual physical exam  -     CBC Auto Differential; Future  -     Lipid Panel; Future  -     Comprehensive Metabolic Panel; Future  -     PSA, Screening; Future    Colon cancer screening  -     Ambulatory referral/consult to Endo Procedure ; Future    Episodic mood disorder  -     Ambulatory referral/consult to Adult Neuropsychology; Future    Depressed affect  -     Ambulatory referral/consult to Adult Neuropsychology; Future          Assessment & Plan    BIPOLAR DISORDER:  - Evaluated patient's history of bipolar-like symptoms since 2022, particularly following an unresponsive episode.  - Assessed effectiveness of OTC lithium supplement for mood stabilization, noting it helps with calmness and mood swings when taken regularly.  - Referred patient to neuropsychology department for evaluation of potential brain injury and underlying issues related to bipolar symptoms.  - Advised to continue taking OTC lithium daily.  Encouraged to keep follow up appt with Psych NP     CHRONIC RHINITIS:  - Noted the patient has significant mucus production in the morning and frequently experiences rhinorrhea.    ESSENTIAL HYPERTENSION:  - Monitored blood pressure with initial reading of 156/90, which decreased to 138/88 after patient rested and relaxed.    MEDICATION NONCOMPLIANCE:  - Documented that the patient intermittently skips lithium doses, affecting its therapeutic effectiveness.        Labs today   Dr. Flower in 6 months      Care Plan/Goals: Reviewed    Goals    None         Follow up: No follow-ups on file.    After visit summary was printed and given to patient upon discharge today.  Patient goals and care plan are included in After Visit Summary.           [1]   Social History  Socioeconomic History    Marital status: Single   Tobacco Use    Smoking status: Every Day     Current packs/day: 0.50     Types: Cigarettes    Smokeless tobacco: Never   Substance and Sexual Activity    Alcohol use: Yes     Comment: socially    Drug use: Yes     Types: Marijuana     Social Drivers of Health     Financial Resource Strain: Low Risk  (5/4/2025)    Overall Financial Resource Strain (CARDIA)     Difficulty of Paying Living Expenses: Not very hard   Food Insecurity: No Food Insecurity (5/4/2025)    Hunger Vital Sign     Worried About Running Out of Food in the Last Year: Never true     Ran Out of Food in the Last Year: Never true   Transportation Needs: No Transportation Needs (5/4/2025)    PRAPARE - Transportation     Lack of Transportation (Medical): No     Lack of Transportation (Non-Medical): No   Physical Activity: Insufficiently Active (5/4/2025)    Exercise Vital Sign     Days of Exercise per Week: 2 days     Minutes of Exercise per Session: 60 min   Stress: Stress Concern Present (5/4/2025)    [employer] - Occupational Stress Questionnaire     Feeling of Stress : Very much   Housing Stability: Low Risk  (5/4/2025)    Housing Stability Vital Sign     Unable to Pay for Housing in the Last Year: No     Number of Times Moved in the Last Year: 0     Homeless in the Last Year: No   [2]   Patient Active Problem List  Diagnosis    Large cell lymphoma    Umbilical hernia    Lipoma    Immunization deficiency    Bipolar disease, chronic   [3]   Current Outpatient Medications on File Prior to Visit   Medication Sig Dispense Refill    nicotine (NICODERM CQ) 7 mg/24 hr Place 1 patch onto the skin once daily. (Patient not  taking: Reported on 5/6/2025) 30 patch 3    [DISCONTINUED] ARIPiprazole (ABILIFY) 5 MG Tab Take 1 tablet (5 mg total) by mouth once daily. (Patient not taking: Reported on 5/6/2025) 30 tablet 11    [DISCONTINUED] buPROPion (WELLBUTRIN XL) 300 MG 24 hr tablet Take 1 tablet (300 mg total) by mouth once daily. (Patient not taking: Reported on 5/6/2025) 30 tablet 1    [DISCONTINUED] tadalafiL (CIALIS) 10 MG tablet Take 1 tablet (10 mg total) by mouth daily as needed for Erectile Dysfunction. (Patient not taking: Reported on 5/6/2025) 30 tablet 1     No current facility-administered medications on file prior to visit.

## 2025-05-07 LAB
ANISOCYTOSIS BLD QL SMEAR: SLIGHT
BURR CELLS BLD QL SMEAR: NORMAL
HYPOCHROMIA BLD QL SMEAR: NORMAL
LARGE/GIANT PLATELETS (OHS): PRESENT
OVALOCYTES BLD QL SMEAR: NORMAL
PLATELET BLD QL SMEAR: NORMAL
POIKILOCYTOSIS BLD QL SMEAR: SLIGHT
SCHISTOCYTES BLD QL SMEAR: NORMAL
SCHISTOCYTES BLD QL SMEAR: PRESENT
SPHEROCYTES BLD QL SMEAR: NORMAL
TARGETS BLD QL SMEAR: NORMAL

## 2025-05-13 ENCOUNTER — HOSPITAL ENCOUNTER (OUTPATIENT)
Dept: PREADMISSION TESTING | Facility: HOSPITAL | Age: 55
Discharge: HOME | End: 2025-05-13
Attending: COLON & RECTAL SURGERY
Payer: COMMERCIAL

## 2025-05-13 DIAGNOSIS — Z12.11 COLON CANCER SCREENING: Primary | ICD-10-CM

## 2025-05-13 RX ORDER — POLYETHYLENE GLYCOL 3350, SODIUM SULFATE ANHYDROUS, SODIUM BICARBONATE, SODIUM CHLORIDE, POTASSIUM CHLORIDE 236; 22.74; 6.74; 5.86; 2.97 G/4L; G/4L; G/4L; G/4L; G/4L
4 POWDER, FOR SOLUTION ORAL ONCE
Qty: 4000 ML | Refills: 0 | Status: SHIPPED | OUTPATIENT
Start: 2025-05-13 | End: 2025-05-13